# Patient Record
Sex: FEMALE | Race: WHITE | NOT HISPANIC OR LATINO | Employment: OTHER | URBAN - NONMETROPOLITAN AREA
[De-identification: names, ages, dates, MRNs, and addresses within clinical notes are randomized per-mention and may not be internally consistent; named-entity substitution may affect disease eponyms.]

---

## 2017-01-03 ENCOUNTER — OFFICE VISIT (OUTPATIENT)
Dept: SURGERY | Facility: CLINIC | Age: 62
End: 2017-01-03

## 2017-01-03 VITALS
SYSTOLIC BLOOD PRESSURE: 152 MMHG | DIASTOLIC BLOOD PRESSURE: 79 MMHG | BODY MASS INDEX: 30.08 KG/M2 | HEART RATE: 88 BPM | WEIGHT: 169.8 LBS

## 2017-01-03 DIAGNOSIS — R10.84 GENERALIZED ABDOMINAL PAIN: ICD-10-CM

## 2017-01-03 DIAGNOSIS — K59.09 OTHER CONSTIPATION: Primary | ICD-10-CM

## 2017-01-03 PROCEDURE — 99212 OFFICE O/P EST SF 10 MIN: CPT | Performed by: SURGERY

## 2017-01-03 NOTE — LETTER
January 3, 2017     Arsenio Harris MD  [address]  [facility] [address]    Patient: Elsa Porras   YOB: 1955   Date of Visit: 1/3/2017       Dear Dr. Steven MD:    Thank you for referring Elsa Porras to me for evaluation. Below are the relevant portions of my assessment and plan of care.       HPI  Patient is a 61-year-old white female here for follow-up on a colonoscopy.  He also reports she is having some discomfort in her left lower quadrant where she is had 8 hernia repairs.  Colonoscopy was normal.                ASSESSMENT  Normal colonoscopy.  With left inguinal tenderness        PLAN    Return when necessary        If you have questions, please do not hesitate to call me. I look forward to following Elsa along with you.         Sincerely,        Adrian Cruz MD        CC: No Recipients

## 2017-01-03 NOTE — PROGRESS NOTES
1/3/2017    Patient Information  Elsa Porras  [address] [place] [address]  1955  [phone] (home)     Chief Complaint   Patient presents with   • Post-op     colonoscopy to cecum         HPI  Patient is a 61-year-old white female here for follow-up on a colonoscopy.  He also reports she is having some discomfort in her left lower quadrant where she is had 8 hernia repairs.  Colonoscopy was normal.        Review of Systems:    General: weight gain 10lbs  Integumentary: negative  Eyes: negative  ENT: earache  Respiratory: shortness of breath  Gastrointestinal: nausea/vomiting, constipation, change in stool and abdominal pain  Cardiovascular: chest pain  Neurological: headaches and dizziness  Psychiatric: anxiety  Hematologic/Lymphatic: negative  Genitourinary: negative  Musculoskeletal: painful joints and back pain  Endocrine: overactive thyroid  Breasts: negative      Patient Active Problem List   Diagnosis   • Chest pain   • Mitral valve disease   • RBBB (right bundle branch block)   • GERD (gastroesophageal reflux disease)   • Hypertension   • Hyperlipidemia   • Obesity   • Hypothyroidism   • Fibromyalgia   • Anxiety and depression   • Panic disorder   • Reactive airway disease         Past Medical History   Diagnosis Date   • Anxiety and depression 2016   • Arthritis    • Asthma    • Bell's palsy    • Cancer      skin   • Chest pain    • Esophageal stricture    • Fibromyalgia 2016   • GERD (gastroesophageal reflux disease) 2016   • Hiatal hernia    • Hyperlipidemia 2016   • Hypertension 2016   • Hypothyroidism 2016   • Inguinal hernia      left   • Migraines    • Mitral valve disease 2016   • Obesity 2016   • Panic disorder 2016   • PONV (postoperative nausea and vomiting)    • Reactive airway disease 2016         Past Surgical History   Procedure Laterality Date   • Abdominal surgery     •  section     • Hysterectomy     • Cholecystectomy      • Hernia repair       multiple   • Breast surgery       Right breast lumpectomy.    • Skin cancer excision     • Colonoscopy     • Cardiac catheterization     • Colonoscopy N/A 12/21/2016     Procedure: COLONOSCOPY;  Surgeon: Adrian Cruz MD;  Location: [facility];  Service:          Family History   Problem Relation Age of Onset   • Hypertension Mother    • Cancer Father    • Hypertension Maternal Grandmother    • Heart disease Maternal Grandmother    • Hypertension Maternal Grandfather    • Heart disease Maternal Grandfather          Social History   Substance Use Topics   • Smoking status: Never Smoker   • Smokeless tobacco: Never Used   • Alcohol use No       Current Outpatient Prescriptions   Medication Sig Dispense Refill   • ALPRAZolam XR (XANAX XR) 0.5 MG 24 hr tablet Take one AM and two HS 90 tablet 4   • bumetanide (BUMEX) 1 MG tablet Take 1 mg by mouth Daily.     • cetirizine (zyrTEC) 10 MG tablet Take 10 mg by mouth Daily.     • Cholecalciferol (VITAMIN D PO) Take  by mouth.     • citalopram (CELEXA) 20 MG tablet Take 1 tablet by mouth Every Night. 30 tablet 4   • levothyroxine (SYNTHROID, LEVOTHROID) 50 MCG tablet Take 50 mcg by mouth Daily.     • omeprazole (priLOSEC) 20 MG capsule Take 20 mg by mouth Daily.     • polyethylene glycol (MIRALAX) powder Take 17 g by mouth Daily for 30 days. 1 each 1   • potassium citrate (UROCIT-K) 5 MEQ (540 MG) CR tablet Take  by mouth 3 (Three) Times a Day With Meals.     • QUEtiapine (SEROquel) 50 MG tablet Take 1 tablet by mouth Every Night. 30 tablet 4   • spironolactone (ALDACTONE) 25 MG tablet Take 25 mg by mouth Daily.       No current facility-administered medications for this visit.          Allergies  Codeine; Isosorbide; Lorcet [hydrocodone-acetaminophen]; Other; Percocet [oxycodone-acetaminophen]; and Ultram [tramadol]      Physical Exam  Surgical scar left inguinal area.  Mild tenderness    Visit Vitals   • /79   • Pulse 88   • Wt 169 lb 12.8 oz  (77 kg)   • BMI 30.08 kg/m2         ASSESSMENT  Normal colonoscopy.  With left inguinal tenderness        PLAN    Return when necessary        Adrian Cruz MD

## 2017-01-03 NOTE — MR AVS SNAPSHOT
Elsa GAITAN Porras   1/3/2017 9:45 AM   Office Visit    Dept Phone:  [phone]   Encounter #:  [id number]    Provider:  Adrian Cruz MD   Department:  [facility] GENERAL SURGERY                Your Full Care Plan              Your Updated Medication List          This list is accurate as of: 1/3/17 11:13 AM.  Always use your most recent med list.                ALDACTONE 25 MG tablet   Generic drug:  spironolactone       ALPRAZolam XR 0.5 MG 24 hr tablet   Commonly known as:  XANAX XR   Take one AM and two HS       bumetanide 1 MG tablet   Commonly known as:  BUMEX       cetirizine 10 MG tablet   Commonly known as:  zyrTEC       citalopram 20 MG tablet   Commonly known as:  CELEXA   Take 1 tablet by mouth Every Night.       levothyroxine 50 MCG tablet   Commonly known as:  SYNTHROID, LEVOTHROID       omeprazole 20 MG capsule   Commonly known as:  priLOSEC       polyethylene glycol powder   Commonly known as:  MIRALAX   Take 17 g by mouth Daily for 30 days.       potassium citrate 5 MEQ (540 MG) CR tablet   Commonly known as:  UROCIT-K       QUEtiapine 50 MG tablet   Commonly known as:  SEROquel   Take 1 tablet by mouth Every Night.       VITAMIN D PO               Instructions     None    Patient Instructions History      Upcoming Appointments     Visit Type Date Time Department    POST-OP 1/3/2017  9:45 AM [facility] SURG [facility]    MEDICINE CHECK 4/11/2017  1:30 PM DELIA Biswas Signup     Our records [facility] that [facility]. If you would like to sign up for [employer], please email [facility], [place], [facility] by [facility] [email or web address] or call [phone] to obtain an activation code.             Other Info from Your Visit           Your Appointments     Apr 11, 2017  1:30 PM EDT   Medicine Check with Braydon Ibarra MD   [facility] BEHAVIORAL HE[facility] (--)    [address] [place] [id number]   [phone]                 Allergies     Codeine      gastrointestinal upset.     Isosorbide      MONONITRATE, migraines.     Lorcet [Hydrocodone-acetaminophen]       rash, nausea, vomiting.     Other      DARVOCET, rash, nausea, vomiting,     Percocet [Oxycodone-acetaminophen]      rash, nausea and vomiting.    Ultram [Tramadol]       rash, nausea, vomiting,       Reason for Visit     Post-op colonoscopy to cecum      Vital Signs     Blood Pressure Pulse Weight Body Mass Index Smoking Status       152/79 88 169 lb 12.8 oz (77 kg) 30.08 kg/m2 Never Smoker

## 2017-03-27 RX ORDER — SPIRONOLACTONE 25 MG/1
TABLET ORAL
Qty: 30 TABLET | Refills: 5 | Status: SHIPPED | OUTPATIENT
Start: 2017-03-27 | End: 2017-11-13 | Stop reason: SDUPTHER

## 2017-04-12 ENCOUNTER — LAB (OUTPATIENT)
Dept: LAB | Facility: HOSPITAL | Age: 62
End: 2017-04-12

## 2017-04-12 ENCOUNTER — OFFICE VISIT (OUTPATIENT)
Dept: CARDIOLOGY | Facility: CLINIC | Age: 62
End: 2017-04-12

## 2017-04-12 VITALS
DIASTOLIC BLOOD PRESSURE: 72 MMHG | WEIGHT: 166 LBS | SYSTOLIC BLOOD PRESSURE: 124 MMHG | BODY MASS INDEX: 29.41 KG/M2 | HEART RATE: 72 BPM | HEIGHT: 63 IN

## 2017-04-12 DIAGNOSIS — I05.9 MITRAL VALVE DISEASE: Primary | ICD-10-CM

## 2017-04-12 DIAGNOSIS — E78.5 HYPERLIPIDEMIA LDL GOAL <100: ICD-10-CM

## 2017-04-12 DIAGNOSIS — R00.2 PALPITATIONS: ICD-10-CM

## 2017-04-12 DIAGNOSIS — R07.9 CHEST PAIN SYNDROME: ICD-10-CM

## 2017-04-12 DIAGNOSIS — R55 PRE-SYNCOPE: ICD-10-CM

## 2017-04-12 DIAGNOSIS — R06.09 DYSPNEA ON EXERTION: ICD-10-CM

## 2017-04-12 DIAGNOSIS — M79.601 RIGHT ARM PAIN: ICD-10-CM

## 2017-04-12 DIAGNOSIS — I10 ESSENTIAL HYPERTENSION: ICD-10-CM

## 2017-04-12 DIAGNOSIS — I45.10 RBBB (RIGHT BUNDLE BRANCH BLOCK): ICD-10-CM

## 2017-04-12 PROBLEM — R06.02 SHORTNESS OF BREATH: Status: ACTIVE | Noted: 2017-04-12

## 2017-04-12 LAB
ALBUMIN SERPL-MCNC: 4.4 G/DL (ref 3.2–4.8)
ALBUMIN/GLOB SERPL: 1.7 G/DL (ref 1.5–2.5)
ALP SERPL-CCNC: 77 U/L (ref 25–100)
ALT SERPL W P-5'-P-CCNC: 38 U/L (ref 7–40)
ANION GAP SERPL CALCULATED.3IONS-SCNC: 9 MMOL/L (ref 3–11)
AST SERPL-CCNC: 41 U/L (ref 0–33)
BILIRUB SERPL-MCNC: 0.5 MG/DL (ref 0.3–1.2)
BUN BLD-MCNC: 11 MG/DL (ref 9–23)
BUN/CREAT SERPL: 15.7 (ref 7–25)
CALCIUM SPEC-SCNC: 9.5 MG/DL (ref 8.7–10.4)
CHLORIDE SERPL-SCNC: 103 MMOL/L (ref 99–109)
CO2 SERPL-SCNC: 28 MMOL/L (ref 20–31)
CREAT BLD-MCNC: 0.7 MG/DL (ref 0.6–1.3)
DEPRECATED FTI SERPL-MCNC: 3 TBI
GFR SERPL CREATININE-BSD FRML MDRD: 85 ML/MIN/1.73
GLOBULIN UR ELPH-MCNC: 2.6 GM/DL
GLUCOSE BLD-MCNC: 144 MG/DL (ref 70–100)
HBA1C MFR BLD: 5.4 % (ref 4.8–5.6)
POTASSIUM BLD-SCNC: 3.9 MMOL/L (ref 3.5–5.5)
PROT SERPL-MCNC: 7 G/DL (ref 5.7–8.2)
SODIUM BLD-SCNC: 140 MMOL/L (ref 132–146)
T3RU NFR SERPL: 33.2 % (ref 23–37)
T4 SERPL-MCNC: 9 MCG/DL (ref 4.7–11.4)
TSH SERPL DL<=0.05 MIU/L-ACNC: 2.27 MIU/ML (ref 0.35–5.35)

## 2017-04-12 PROCEDURE — 36415 COLL VENOUS BLD VENIPUNCTURE: CPT

## 2017-04-12 PROCEDURE — 84436 ASSAY OF TOTAL THYROXINE: CPT

## 2017-04-12 PROCEDURE — 99214 OFFICE O/P EST MOD 30 MIN: CPT | Performed by: NURSE PRACTITIONER

## 2017-04-12 PROCEDURE — 84443 ASSAY THYROID STIM HORMONE: CPT

## 2017-04-12 PROCEDURE — 83036 HEMOGLOBIN GLYCOSYLATED A1C: CPT

## 2017-04-12 PROCEDURE — 80053 COMPREHEN METABOLIC PANEL: CPT

## 2017-04-12 PROCEDURE — 84479 ASSAY OF THYROID (T3 OR T4): CPT

## 2017-04-12 RX ORDER — ALLOPURINOL 300 MG/1
300 TABLET ORAL DAILY
Refills: 2 | COMMUNITY
Start: 2017-04-04 | End: 2017-05-26

## 2017-04-12 RX ORDER — ASPIRIN 81 MG/1
81 TABLET ORAL DAILY
COMMUNITY
End: 2020-06-19

## 2017-04-12 NOTE — PROGRESS NOTES
"Encounter Date:04/12/2017    Patient ID: Elas Porras is a 62 y.o. female who is  and resides in [place], [place].    CC/Reason for visit:  Mitral Valve Disease (Follow up); Hypertension; and Hyperlipidemia          Elsa Porras returns today at the request of her primary care provider due to complaints of presyncopal episodes.  The patient reports that approximately 6 months ago she began having \"spells\" that primarily occur while she is taking a shower or bath.  She will feel very weak, lightheaded and feels as if she will pass out.  She has to go lie down in the bed because she fears she will pass out.  After lying down she does start to feel better but will fill drained for the rest of the day.  These episodes also occur if she strenuously exerts herself.  She has taken her blood pressure during these episodes and it is always elevated with a systolic in the 160 to 170s.  Her heart rate is usually elevated over 100 during these episodes.  She will also experience shortness of breath with strenuous exertion.  She also reports a fluttering sensation in her chest that occurs almost weekly and is not necessarily associated with her \"spells\".  She denies chest pain, orthopnea or syncope.  She does report right arm pain that feels like a band wrapped tight that is constant.  It feels better when she stretches her arm out.  She says it almost feels like a charley horse.  She initially presented to her primary care provider with these symptoms who suggested she make an appointment with her cardiologist.  She has been taking spironolactone and Bumex for the past 2-3 years that she believes was ordered to control her blood pressure and because of edema.  She reports having a history of low potassium levels.  She is taking 5 mEq potassium daily.  It feels better when she stretches her arm out.  She does not recall injuring it in any way.  She is not sure if this is related to her fibromyalgia.  She also has skin " cancer and has been seeing a local dermatologist.  She wused a topical chemotherapy cream throughout the month of January and February.       Review of Systems   Constitution: Positive for chills, weakness and malaise/fatigue.   HENT: Positive for headaches.    Eyes: Positive for blurred vision and visual halos.   Cardiovascular: Positive for chest pain, dyspnea on exertion, leg swelling and palpitations.   Respiratory: Positive for shortness of breath, snoring and wheezing.    Endocrine: Positive for heat intolerance.   Skin: Positive for dry skin, skin cancer and suspicious lesions.   Musculoskeletal: Positive for arthritis, back pain, gout, joint pain, joint swelling, muscle cramps, myalgias, neck pain and stiffness.   Gastrointestinal: Positive for bloating and flatus.   Neurological: Positive for difficulty with concentration and dizziness.   Psychiatric/Behavioral: The patient is nervous/anxious.        The patient's past medical, social, and family history reviewed in the patient's electronic medical record.    Allergies  Codeine; Isosorbide nitrate; Lorcet [hydrocodone-acetaminophen]; Other; Percocet [oxycodone-acetaminophen]; and Ultram [tramadol]    Outpatient Prescriptions Marked as Taking for the 4/12/17 encounter (Office Visit) with Binh Ga MD   Medication Sig Dispense Refill   • allopurinol (ZYLOPRIM) 300 MG tablet Take 300 mg by mouth Daily.  2   • ALPRAZolam XR (XANAX XR) 0.5 MG 24 hr tablet Take one AM and two HS 90 tablet 4   • aspirin 81 MG EC tablet Take 81 mg by mouth Daily.     • Cholecalciferol (VITAMIN D PO) Take  by mouth.     • citalopram (CELEXA) 20 MG tablet Take 1 tablet by mouth Every Night. 30 tablet 4   • levothyroxine (SYNTHROID, LEVOTHROID) 50 MCG tablet Take 50 mcg by mouth Daily.     • potassium citrate (UROCIT-K) 5 MEQ (540 MG) CR tablet Take  by mouth 3 (Three) Times a Day With Meals.     • QUEtiapine (SEROquel) 50 MG tablet Take 1 tablet by mouth Every Night.  "30 tablet 4   • spironolactone (ALDACTONE) 25 MG tablet TAKE 1 TABLET BY MOUTH ONCE A DAY IN THE MORNING AS DIRECTED BY YOUR PRESCRIBER FOR FLUID RETENTION 30 tablet 5   • [DISCONTINUED] bumetanide (BUMEX) 1 MG tablet Take 1 mg by mouth Daily.           Blood pressure 124/72, pulse 72, height 63\" (160 cm), weight 166 lb (75.3 kg).  Body mass index is 29.41 kg/(m^2).    Physical Exam   Constitutional: She is oriented to person, place, and time. She appears well-developed and well-nourished.   HENT:   Head: Normocephalic and atraumatic.   Eyes: Pupils are equal, round, and reactive to light. No scleral icterus.   Neck: No JVD present. Carotid bruit is not present. No thyromegaly present.   Cardiovascular: Normal rate, regular rhythm, S1 normal and S2 normal.  Exam reveals no gallop.    No murmur heard.  Pulmonary/Chest: Effort normal and breath sounds normal.   Abdominal: Soft. There is no tenderness.   Neurological: She is alert and oriented to person, place, and time.   Skin: Skin is warm and dry. No cyanosis. Nails show no clubbing.   Psychiatric: She has a normal mood and affect. Her behavior is normal.       Data Review:     Lab Results   Component Value Date    GLUCOSE 90 03/18/2016    BUN 8 (L) 03/18/2016    CREATININE 0.8 03/18/2016    BCR 12.3 11/15/2015    K 4.7 03/18/2016    CO2 25 03/18/2016    CALCIUM 9.7 03/18/2016    ALBUMIN 4.3 03/18/2016    LABIL2 1.5 11/15/2015    AST 31 03/18/2016    ALT 23 03/18/2016     Lab Results   Component Value Date    WBC 8.9 11/15/2015    HGB 14.5 11/15/2015    HCT 45.0 11/15/2015    MCV 92.5 11/15/2015     11/15/2015         ECG 12 Lead  Date/Time: 4/12/2017 11:08 AM  Performed by: RODGER FRIEDMAN  Authorized by: RODGER FRIEDMAN   Rhythm: sinus rhythm  BPM: 72  Conduction: right bundle branch block  Comments: NV interval 144 MS  QRS duration 1:30 MS  QT//407 MS                 Problem List Items Addressed This Visit        Cardiovascular and Mediastinum "    RBBB (right bundle branch block)    Overview     · Reportedly moderate mitral regurgitation on echocardiogram, 2009.   · Echocardiogram (02/21/2013): LVEF 55% to 60%, trace MR.   · Echocardiogram (08/13/2015):  LVEF > than 65%. No hemodynamically significant valvular disease.         Current Assessment & Plan     · Obtain echocardiogram         Essential hypertension    Current Assessment & Plan     · Continue spiral lactone 25 mg daily  · Hold Bumex  · Obtain CMP         Relevant Orders    Comprehensive Metabolic Panel    Hyperlipidemia LDL goal <100    Current Assessment & Plan     · Continue healthy heart diet  · Statin therapy not needed at this time         Palpitations    Current Assessment & Plan     · 2 week ZIO patch  · Echocardiogram at [facility]  · Obtain TSH and thyroid panel         Relevant Orders    Adult Transthoracic Echo Complete With Contrast    Cardiac Event Monitor    Thyroid Panel With TSH    Pre-syncope    Current Assessment & Plan     · 2 week ZIO patch  · Echocardiogram at [facility]  · Obtain hemoglobin A1c         Relevant Orders    Adult Transthoracic Echo Complete With Contrast    Cardiac Event Monitor    Hemoglobin A1c    RESOLVED: Mitral valve disease - Primary    Overview                   Respiratory    Dyspnea on exertion    Current Assessment & Plan     · Echocardiogram         Relevant Orders    Adult Transthoracic Echo Complete With Contrast       Nervous and Auditory    Chest pain syndrome    Overview     · Previous heart catheterization with normal coronary arteries by Dr. Theodore, data-deficit.   · MPS (2008): Normal pharmacologic study with no ischemia  · MPS (04/10/2012): Normal pharmacologic study with no ischemia  · Recurrent chest pain at [facility] ER presentation with negative enzymes and EKG, (07/29/2013)..  · MPS (03/18/2016): Normal pharmacologic study with no ischemia..         Current Assessment & Plan     · Asymptomatic at this time. Stress  test in 2016 normal         Right arm pain    Current Assessment & Plan     · Likely related to fibromyalgia or musculoskeletal issues, follow-up with primary care provider             Mrs. Porras has an array of symptoms and I'm not certain if they are cardiac in nature.  She could be experiencing vasovagal syncope, arrhythmias, electrolyte imbalances, dehydration or symptoms related to her right bundle branch block although her EKG is unchanged when compared to an EKG from 2015.  I will obtain an echocardiogram and order a 30 day event monitor.  I will obtain a TSH with thyroid panel, CMP, and a hemoglobin A1c.  I had requested she hold her Bumex and to contact us in one week to see if this improves her symptoms.  We will have her follow back up with us in 6 weeks to review her results and make further recommendations.     · Hold Bumex and contact us in one week to report symptoms  · 30 day event monitor  · TSH with thyroid panel, CMP, hemoglobin A1c  · Echocardiogram at [facility] and schedule follow up for same day in 6 weeks.    Anjali OMER evaluated treated patient independently    KAN Titus  4/12/2017

## 2017-04-12 NOTE — ASSESSMENT & PLAN NOTE
· 2 week ZIO patch  · Echocardiogram at [facility]  · Obtain hemoglobin A1c

## 2017-04-12 NOTE — ASSESSMENT & PLAN NOTE
· 2 week ZIO patch  · Echocardiogram at [facility]  · Obtain TSH and thyroid panel

## 2017-04-14 ENCOUNTER — TELEPHONE (OUTPATIENT)
Dept: CARDIOLOGY | Facility: CLINIC | Age: 62
End: 2017-04-14

## 2017-04-14 NOTE — TELEPHONE ENCOUNTER
Patient called to report that she has started taking her Bumex again, only after holding it for 2 days. She stated that she started noticing significant increase in swelling of her hands, face, and ankles. She did not think any of her symptoms were resolved with only holding the medicine 2 days. I discussed her lab results with the patient. She verbalized understanding and will call back with any further problems.

## 2017-05-11 ENCOUNTER — OFFICE VISIT (OUTPATIENT)
Dept: PSYCHIATRY | Facility: CLINIC | Age: 62
End: 2017-05-11

## 2017-05-11 VITALS
HEIGHT: 63 IN | WEIGHT: 168 LBS | HEART RATE: 76 BPM | BODY MASS INDEX: 29.77 KG/M2 | SYSTOLIC BLOOD PRESSURE: 131 MMHG | DIASTOLIC BLOOD PRESSURE: 65 MMHG

## 2017-05-11 DIAGNOSIS — F34.1 DYSTHYMIC DISORDER: Primary | ICD-10-CM

## 2017-05-11 PROCEDURE — 99213 OFFICE O/P EST LOW 20 MIN: CPT | Performed by: PSYCHIATRY & NEUROLOGY

## 2017-05-11 RX ORDER — ALPRAZOLAM 0.5 MG/1
TABLET, EXTENDED RELEASE ORAL
Qty: 90 TABLET | Refills: 4 | Status: SHIPPED | OUTPATIENT
Start: 2017-05-11 | End: 2017-05-11 | Stop reason: SDUPTHER

## 2017-05-11 RX ORDER — QUETIAPINE FUMARATE 50 MG/1
50 TABLET, FILM COATED ORAL NIGHTLY
Qty: 90 TABLET | Refills: 1 | Status: SHIPPED | OUTPATIENT
Start: 2017-05-11 | End: 2018-01-11 | Stop reason: SDUPTHER

## 2017-05-11 RX ORDER — QUETIAPINE FUMARATE 50 MG/1
50 TABLET, FILM COATED ORAL NIGHTLY
Qty: 30 TABLET | Refills: 4 | Status: SHIPPED | OUTPATIENT
Start: 2017-05-11 | End: 2017-05-11 | Stop reason: SDUPTHER

## 2017-05-11 RX ORDER — CITALOPRAM 20 MG/1
20 TABLET ORAL NIGHTLY
Qty: 90 TABLET | Refills: 1 | Status: SHIPPED | OUTPATIENT
Start: 2017-05-11 | End: 2018-01-11 | Stop reason: SDUPTHER

## 2017-05-11 RX ORDER — CITALOPRAM 20 MG/1
20 TABLET ORAL NIGHTLY
Qty: 30 TABLET | Refills: 4 | Status: SHIPPED | OUTPATIENT
Start: 2017-05-11 | End: 2017-05-11 | Stop reason: SDUPTHER

## 2017-05-11 RX ORDER — ALPRAZOLAM 0.5 MG/1
TABLET, EXTENDED RELEASE ORAL
Qty: 90 TABLET | Refills: 4 | Status: SHIPPED | OUTPATIENT
Start: 2017-05-11 | End: 2017-10-17 | Stop reason: SDUPTHER

## 2017-05-22 RX ORDER — ALPRAZOLAM 0.5 MG/1
TABLET, EXTENDED RELEASE ORAL
Qty: 90 TABLET | Refills: 4 | OUTPATIENT
Start: 2017-05-22

## 2017-05-26 ENCOUNTER — OFFICE VISIT (OUTPATIENT)
Dept: CARDIOLOGY | Facility: CLINIC | Age: 62
End: 2017-05-26

## 2017-05-26 ENCOUNTER — HOSPITAL ENCOUNTER (OUTPATIENT)
Dept: CARDIOLOGY | Facility: HOSPITAL | Age: 62
Discharge: HOME | End: 2017-05-26
Admitting: NURSE PRACTITIONER

## 2017-05-26 VITALS
SYSTOLIC BLOOD PRESSURE: 122 MMHG | BODY MASS INDEX: 29.06 KG/M2 | HEIGHT: 63 IN | DIASTOLIC BLOOD PRESSURE: 80 MMHG | WEIGHT: 164 LBS | HEART RATE: 59 BPM

## 2017-05-26 VITALS
HEIGHT: 63 IN | SYSTOLIC BLOOD PRESSURE: 132 MMHG | WEIGHT: 162 LBS | BODY MASS INDEX: 28.7 KG/M2 | DIASTOLIC BLOOD PRESSURE: 71 MMHG

## 2017-05-26 DIAGNOSIS — R00.2 PALPITATIONS: ICD-10-CM

## 2017-05-26 DIAGNOSIS — E78.5 HYPERLIPIDEMIA LDL GOAL <100: ICD-10-CM

## 2017-05-26 DIAGNOSIS — R06.09 DYSPNEA ON EXERTION: ICD-10-CM

## 2017-05-26 DIAGNOSIS — I10 ESSENTIAL HYPERTENSION: ICD-10-CM

## 2017-05-26 DIAGNOSIS — R07.9 CHEST PAIN SYNDROME: Primary | ICD-10-CM

## 2017-05-26 DIAGNOSIS — R55 PRE-SYNCOPE: ICD-10-CM

## 2017-05-26 DIAGNOSIS — I45.10 RBBB (RIGHT BUNDLE BRANCH BLOCK): ICD-10-CM

## 2017-05-26 DIAGNOSIS — I34.1 MITRAL VALVE PROLAPSE: ICD-10-CM

## 2017-05-26 PROBLEM — M79.601 RIGHT ARM PAIN: Status: RESOLVED | Noted: 2017-04-12 | Resolved: 2017-05-26

## 2017-05-26 LAB
BH CV ECHO MEAS - BSA(HAYCOCK): 1.8 M^2
BH CV ECHO MEAS - BSA: 1.8 M^2
BH CV ECHO MEAS - BZI_BMI: 28.7 KILOGRAMS/M^2
BH CV ECHO MEAS - BZI_METRIC_HEIGHT: 160 CM
BH CV ECHO MEAS - BZI_METRIC_WEIGHT: 73.5 KG
BH CV ECHO MEAS - CONTRAST EF (2CH): 64 ML/M^2
BH CV ECHO MEAS - CONTRAST EF 4CH: 77.4 ML/M^2
BH CV ECHO MEAS - EDV(CUBED): 55.7 ML
BH CV ECHO MEAS - EDV(MOD-SP2): 75 ML
BH CV ECHO MEAS - EDV(MOD-SP4): 84 ML
BH CV ECHO MEAS - EDV(TEICH): 62.7 ML
BH CV ECHO MEAS - EF(CUBED): 71.6 %
BH CV ECHO MEAS - EF(MOD-SP2): 64 %
BH CV ECHO MEAS - EF(MOD-SP4): 77.4 %
BH CV ECHO MEAS - EF(TEICH): 64.1 %
BH CV ECHO MEAS - ESV(CUBED): 15.8 ML
BH CV ECHO MEAS - ESV(MOD-SP2): 27 ML
BH CV ECHO MEAS - ESV(MOD-SP4): 19 ML
BH CV ECHO MEAS - ESV(TEICH): 22.5 ML
BH CV ECHO MEAS - FS: 34.3 %
BH CV ECHO MEAS - IVS/LVPW: 1.2
BH CV ECHO MEAS - IVSD: 0.79 CM
BH CV ECHO MEAS - LA DIMENSION: 3.6 CM
BH CV ECHO MEAS - LV DIASTOLIC VOL/BSA (35-75): 47.5 ML/M^2
BH CV ECHO MEAS - LV MASS(C)D: 76.5 GRAMS
BH CV ECHO MEAS - LV MASS(C)DI: 43.3 GRAMS/M^2
BH CV ECHO MEAS - LV MAX PG: 3.6 MMHG
BH CV ECHO MEAS - LV MEAN PG: 2 MMHG
BH CV ECHO MEAS - LV SYSTOLIC VOL/BSA (12-30): 10.7 ML/M^2
BH CV ECHO MEAS - LV V1 MAX: 95.5 CM/SEC
BH CV ECHO MEAS - LV V1 MEAN: 67.8 CM/SEC
BH CV ECHO MEAS - LV V1 VTI: 26.7 CM
BH CV ECHO MEAS - LVIDD: 3.8 CM
BH CV ECHO MEAS - LVIDS: 2.5 CM
BH CV ECHO MEAS - LVLD AP2: 6.8 CM
BH CV ECHO MEAS - LVLD AP4: 7.4 CM
BH CV ECHO MEAS - LVLS AP2: 5.2 CM
BH CV ECHO MEAS - LVLS AP4: 5.1 CM
BH CV ECHO MEAS - LVOT AREA (M): 2.5 CM^2
BH CV ECHO MEAS - LVOT AREA: 2.5 CM^2
BH CV ECHO MEAS - LVOT DIAM: 1.8 CM
BH CV ECHO MEAS - LVPWD: 0.67 CM
BH CV ECHO MEAS - PA ACC SLOPE: 444 CM/SEC^2
BH CV ECHO MEAS - PA ACC TIME: 0.18 SEC
BH CV ECHO MEAS - PA PR(ACCEL): -2.9 MMHG
BH CV ECHO MEAS - RAP SYSTOLE: 8 MMHG
BH CV ECHO MEAS - RVDD: 3.1 CM
BH CV ECHO MEAS - RVSP: 23.8 MMHG
BH CV ECHO MEAS - SI(CUBED): 22.6 ML/M^2
BH CV ECHO MEAS - SI(LVOT): 38.4 ML/M^2
BH CV ECHO MEAS - SI(MOD-SP2): 27.1 ML/M^2
BH CV ECHO MEAS - SI(MOD-SP4): 36.8 ML/M^2
BH CV ECHO MEAS - SI(TEICH): 22.7 ML/M^2
BH CV ECHO MEAS - SV(CUBED): 39.9 ML
BH CV ECHO MEAS - SV(LVOT): 67.9 ML
BH CV ECHO MEAS - SV(MOD-SP2): 48 ML
BH CV ECHO MEAS - SV(MOD-SP4): 65 ML
BH CV ECHO MEAS - SV(TEICH): 40.2 ML
BH CV ECHO MEAS - TAPSE (>1.6): 2.3 CM2
BH CV ECHO MEAS - TR MAX VEL: 199 CM/SEC
BH CV XLRA - RV BASE: 3 CM
BH CV XLRA - RV LENGTH: 5 CM
BH CV XLRA - RV MID: 2.9 CM
LEFT ATRIUM VOLUME INDEX: 23 ML/M2
LV EF 2D ECHO EST: 65 %

## 2017-05-26 PROCEDURE — 93306 TTE W/DOPPLER COMPLETE: CPT | Performed by: INTERNAL MEDICINE

## 2017-05-26 PROCEDURE — 99214 OFFICE O/P EST MOD 30 MIN: CPT | Performed by: INTERNAL MEDICINE

## 2017-05-26 PROCEDURE — 25010000002 SULFUR HEXAFLUORIDE MICROSPH 60.7-25 MG RECONSTITUTED SUSPENSION: Performed by: INTERNAL MEDICINE

## 2017-05-26 PROCEDURE — C8929 TTE W OR WO FOL WCON,DOPPLER: HCPCS

## 2017-05-26 RX ORDER — BUMETANIDE 1 MG/1
1 TABLET ORAL DAILY
COMMUNITY
End: 2017-08-17 | Stop reason: SDUPTHER

## 2017-05-26 RX ORDER — POLYETHYLENE GLYCOL 3350 17 G/17G
POWDER, FOR SOLUTION ORAL AS NEEDED
Refills: 0 | COMMUNITY
Start: 2017-05-22 | End: 2019-06-19

## 2017-05-26 RX ADMIN — SULFUR HEXAFLUORIDE 3 ML: KIT at 10:43

## 2017-06-13 ENCOUNTER — DOCUMENTATION (OUTPATIENT)
Dept: CARDIOLOGY | Facility: CLINIC | Age: 62
End: 2017-06-13

## 2017-07-10 ENCOUNTER — OFFICE VISIT (OUTPATIENT)
Dept: CARDIOLOGY | Facility: CLINIC | Age: 62
End: 2017-07-10

## 2017-07-10 DIAGNOSIS — R00.2 PALPITATIONS: ICD-10-CM

## 2017-07-11 PROCEDURE — 93272 ECG/REVIEW INTERPRET ONLY: CPT | Performed by: INTERNAL MEDICINE

## 2017-07-12 ENCOUNTER — TRANSCRIBE ORDERS (OUTPATIENT)
Dept: ADMINISTRATIVE | Facility: HOSPITAL | Age: 62
End: 2017-07-12

## 2017-07-12 DIAGNOSIS — Z12.31 VISIT FOR SCREENING MAMMOGRAM: Primary | ICD-10-CM

## 2017-07-14 ENCOUNTER — HOSPITAL ENCOUNTER (OUTPATIENT)
Dept: MAMMOGRAPHY | Facility: HOSPITAL | Age: 62
Discharge: HOME | End: 2017-07-14
Admitting: FAMILY MEDICINE

## 2017-07-14 DIAGNOSIS — Z12.31 VISIT FOR SCREENING MAMMOGRAM: ICD-10-CM

## 2017-07-14 PROCEDURE — G0202 SCR MAMMO BI INCL CAD: HCPCS

## 2017-07-14 PROCEDURE — 77063 BREAST TOMOSYNTHESIS BI: CPT

## 2017-07-14 PROCEDURE — 77063 BREAST TOMOSYNTHESIS BI: CPT | Performed by: RADIOLOGY

## 2017-07-14 PROCEDURE — 77067 SCR MAMMO BI INCL CAD: CPT | Performed by: RADIOLOGY

## 2017-08-17 RX ORDER — BUMETANIDE 1 MG/1
TABLET ORAL
Qty: 30 TABLET | Refills: 5 | Status: SHIPPED | OUTPATIENT
Start: 2017-08-17 | End: 2018-02-09 | Stop reason: SDUPTHER

## 2017-10-18 ENCOUNTER — TELEPHONE (OUTPATIENT)
Dept: PSYCHIATRY | Facility: CLINIC | Age: 62
End: 2017-10-18

## 2017-10-18 DIAGNOSIS — K59.09 OTHER CONSTIPATION: ICD-10-CM

## 2017-10-18 RX ORDER — ALPRAZOLAM 0.5 MG/1
TABLET, EXTENDED RELEASE ORAL
Qty: 90 TABLET | Refills: 0 | Status: SHIPPED | OUTPATIENT
Start: 2017-10-18 | End: 2017-12-06 | Stop reason: SDUPTHER

## 2017-10-19 RX ORDER — POLYETHYLENE GLYCOL 3350 17 G/17G
POWDER, FOR SOLUTION ORAL
Qty: 527 G | Refills: 1 | Status: SHIPPED | OUTPATIENT
Start: 2017-10-19 | End: 2018-01-11 | Stop reason: SDUPTHER

## 2017-11-09 ENCOUNTER — TELEPHONE (OUTPATIENT)
Dept: PSYCHIATRY | Facility: CLINIC | Age: 62
End: 2017-11-09

## 2017-11-09 NOTE — TELEPHONE ENCOUNTER
Okay to call in the patient's Seroquel at 50 mg #30 to take 1 at bedtime no refill and Celexa 20 to take 1 daily #30 with no refill

## 2017-11-09 NOTE — TELEPHONE ENCOUNTER
Please call in a prescription for 90 0.5 milligram alprazolam extended release with no refill to the patient's pharmacy.  Prescription should not be filled prior to November 17.    Also advised patient that we'll need to see her prior to any additional prescriptions.

## 2017-11-14 RX ORDER — SPIRONOLACTONE 25 MG/1
TABLET ORAL
Qty: 30 TABLET | Refills: 6 | Status: SHIPPED | OUTPATIENT
Start: 2017-11-14 | End: 2018-05-24 | Stop reason: SDUPTHER

## 2017-12-06 ENCOUNTER — TELEPHONE (OUTPATIENT)
Dept: PSYCHIATRY | Facility: CLINIC | Age: 62
End: 2017-12-06

## 2017-12-06 RX ORDER — ALPRAZOLAM 0.5 MG/1
TABLET, EXTENDED RELEASE ORAL
Qty: 90 TABLET | Refills: 0 | Status: SHIPPED | OUTPATIENT
Start: 2017-12-06 | End: 2018-01-11 | Stop reason: SDUPTHER

## 2018-01-11 ENCOUNTER — OFFICE VISIT (OUTPATIENT)
Dept: PSYCHIATRY | Facility: CLINIC | Age: 63
End: 2018-01-11

## 2018-01-11 VITALS
WEIGHT: 164 LBS | HEART RATE: 77 BPM | DIASTOLIC BLOOD PRESSURE: 67 MMHG | HEIGHT: 63 IN | BODY MASS INDEX: 29.06 KG/M2 | SYSTOLIC BLOOD PRESSURE: 132 MMHG

## 2018-01-11 DIAGNOSIS — F34.1 DYSTHYMIC DISORDER: Primary | ICD-10-CM

## 2018-01-11 PROCEDURE — 99213 OFFICE O/P EST LOW 20 MIN: CPT | Performed by: PSYCHIATRY & NEUROLOGY

## 2018-01-11 RX ORDER — SIMVASTATIN 40 MG
TABLET ORAL NIGHTLY
Refills: 2 | COMMUNITY
Start: 2018-01-02 | End: 2021-03-12 | Stop reason: SDUPTHER

## 2018-01-11 RX ORDER — QUETIAPINE FUMARATE 50 MG/1
TABLET, FILM COATED ORAL
Qty: 180 TABLET | Refills: 1 | Status: SHIPPED | OUTPATIENT
Start: 2018-01-11 | End: 2018-07-10 | Stop reason: SDUPTHER

## 2018-01-11 RX ORDER — CITALOPRAM 20 MG/1
20 TABLET ORAL NIGHTLY
Qty: 90 TABLET | Refills: 1 | Status: SHIPPED | OUTPATIENT
Start: 2018-01-11 | End: 2018-07-10 | Stop reason: SDUPTHER

## 2018-01-11 RX ORDER — ALLOPURINOL 300 MG/1
300 TABLET ORAL DAILY
Refills: 2 | COMMUNITY
Start: 2018-01-02 | End: 2021-12-03

## 2018-01-11 RX ORDER — ALPRAZOLAM 0.5 MG/1
TABLET, EXTENDED RELEASE ORAL
Qty: 90 TABLET | Refills: 5 | Status: SHIPPED | OUTPATIENT
Start: 2018-01-11 | End: 2018-06-29 | Stop reason: SDUPTHER

## 2018-01-11 NOTE — PROGRESS NOTES
"Patient ID: Elsa Porras is a 62 y.o. female    SERVICE TYPE: EVALUATION AND MANAGEMENT (greater than 50% of the time spent for supportive psychotherapy).      /67  Pulse 77  Ht 160 cm (62.99\")  Wt 74.4 kg (164 lb)  BMI 29.06 kg/m2    ALLERGIES:  Codeine; Isosorbide nitrate; Lorcet [hydrocodone-acetaminophen]; Other; Percocet [oxycodone-acetaminophen]; and Ultram [tramadol]    CC: \"I'm a whole lot better, just have some trouble sleeping\"     FOLLOWED FOR: Depression/ Anxiety.     HPI: Menopausal symptoms effecting sleep, not been significantly depressed, worries about her 's health and a friend who is grieving the death of her .  Interest activity and social interactions after norms.     PFSH: Finds rewards in keeping her grandchildren and great-grandchildren on occasion.    Review of Systems   Respiratory: Negative.    Cardiovascular: Negative.    Gastrointestinal: Negative.    Genitourinary: Negative.    Musculoskeletal: Negative.        SUPPORTIVE PSYCHOTHERAPY: continuing efforts to promote the therapeutic alliance, address the patient’s issues, and strengthen self awareness, insights, and coping skills.       Mental Status Exam  Appearance:  clean and casually dressed, appropriate  Attitude toward clinician:  cooperative and agreeable   Speech:    Rate:  regular rate and rhythm   Volume: normal  Motor:  no abnormal movements present  Mood:  Good  Affect:  euthymic  Thought Processes:  linear, logical, and goal directed  Thought Content:  Normal   Feeling Hopeless: absent  Suicidal Thoughts:  absent  Homicidal Thoughts:  absent  Perceptual Disturbance: no perceptual disturbance  Attention and Concentration:  good  Insight and Judgement:  good  Memory:  memory appears to be intact    LABS: No results found for this or any previous visit (from the past 168 hour(s)).    MEDICATION ISSUES: Have discussed with the patient the medications Risks, Benefits, and Side effects including " potential falls, possible impaired driving and  metabolic adversities among others. No medication side effects or related complaints today.     TREATMENT PLAN/GOALS: Continue supportive psychotherapy efforts and medications as indicated.     Current Outpatient Prescriptions   Medication Sig Dispense Refill   • allopurinol (ZYLOPRIM) 300 MG tablet   2   • ALPRAZolam XR (XANAX XR) 0.5 MG 24 hr tablet Take one AM and two HS 90 tablet 5   • aspirin 81 MG EC tablet Take 81 mg by mouth Daily.     • bumetanide (BUMEX) 1 MG tablet TAKE 1 TABLET BY MOUTH ONCE A DAY IN THE MORNING AS DIRECTED BY YOUR PRESCRIBER FOR FLUID RETENTION 30 tablet 5   • Cholecalciferol (VITAMIN D PO) Take 50,000 Units by mouth Every 30 (Thirty) Days.     • citalopram (CELEXA) 20 MG tablet Take 1 tablet by mouth Every Night. 90 tablet 1   • levothyroxine (SYNTHROID, LEVOTHROID) 50 MCG tablet Take 50 mcg by mouth Daily.     • omeprazole (priLOSEC) 20 MG capsule Take 20 mg by mouth Daily As Needed.     • polyethylene glycol (MIRALAX) powder As Needed.  0   • potassium citrate (UROCIT-K) 5 MEQ (540 MG) CR tablet Take  by mouth 3 (Three) Times a Day With Meals.     • QUEtiapine (SEROquel) 50 MG tablet One or two at hs. 180 tablet 1   • simvastatin (ZOCOR) 40 MG tablet   2   • spironolactone (ALDACTONE) 25 MG tablet TAKE 1 TABLET EVERY MORNING AS DIRECTED BY YOUR PRESCRIBER FOR FLUID AND BLOOD PRESSURE 30 tablet 6     No current facility-administered medications for this visit.        COLLATERAL PSYCHOTHERAPEUTIC INTERVENTION: patient not interested in additional psychotherapy.     Encounter Diagnosis   Name Primary?   • Dysthymic disorder Yes       Return in about 6 months (around 7/11/2018).  Patient knows to call if symptoms worsen or fail to improve between appointments.

## 2018-02-09 RX ORDER — BUMETANIDE 1 MG/1
TABLET ORAL
Qty: 30 TABLET | Refills: 5 | Status: SHIPPED | OUTPATIENT
Start: 2018-02-09 | End: 2018-07-17 | Stop reason: SDUPTHER

## 2018-05-24 RX ORDER — SPIRONOLACTONE 25 MG/1
TABLET ORAL
Qty: 30 TABLET | Refills: 1 | Status: SHIPPED | OUTPATIENT
Start: 2018-05-24 | End: 2018-07-17 | Stop reason: SDUPTHER

## 2018-06-28 RX ORDER — ALPRAZOLAM 0.5 MG/1
TABLET, EXTENDED RELEASE ORAL
Qty: 90 TABLET | Refills: 5 | Status: CANCELLED | OUTPATIENT
Start: 2018-06-28

## 2018-06-28 RX ORDER — ALPRAZOLAM 0.5 MG/1
TABLET, EXTENDED RELEASE ORAL
Qty: 90 TABLET | Refills: 5 | OUTPATIENT
Start: 2018-06-28

## 2018-06-29 ENCOUNTER — TELEPHONE (OUTPATIENT)
Dept: PSYCHIATRY | Facility: CLINIC | Age: 63
End: 2018-06-29

## 2018-06-29 RX ORDER — ALPRAZOLAM 0.5 MG/1
TABLET, EXTENDED RELEASE ORAL
Qty: 20 TABLET | Refills: 0 | Status: SHIPPED | OUTPATIENT
Start: 2018-06-29 | End: 2018-07-10 | Stop reason: SDUPTHER

## 2018-07-02 NOTE — TELEPHONE ENCOUNTER
Try again to get the message across that we will be making up the difference with early refills in the future

## 2018-07-10 ENCOUNTER — OFFICE VISIT (OUTPATIENT)
Dept: PSYCHIATRY | Facility: CLINIC | Age: 63
End: 2018-07-10

## 2018-07-10 VITALS
HEART RATE: 98 BPM | DIASTOLIC BLOOD PRESSURE: 65 MMHG | SYSTOLIC BLOOD PRESSURE: 117 MMHG | HEIGHT: 63 IN | BODY MASS INDEX: 29.06 KG/M2 | WEIGHT: 164 LBS

## 2018-07-10 DIAGNOSIS — F34.1 DYSTHYMIC DISORDER: ICD-10-CM

## 2018-07-10 DIAGNOSIS — Z79.899 MEDICATION MANAGEMENT: Primary | ICD-10-CM

## 2018-07-10 LAB
AMPHETAMINE CUT-OFF: 1000
BENZODIAZIPINE CUT-OFF: 300
BUPRENORPHINE CUT-OFF: 10
COCAINE CUT-OFF: 300
EXTERNAL AMPHETAMINE SCREEN URINE: NEGATIVE
EXTERNAL BENZODIAZEPINE SCREEN URINE: POSITIVE
EXTERNAL BUPRENORPHINE SCREEN URINE: NEGATIVE
EXTERNAL COCAINE SCREEN URINE: NEGATIVE
EXTERNAL MDMA: NEGATIVE
EXTERNAL METHADONE SCREEN URINE: NEGATIVE
EXTERNAL METHAMPHETAMINE SCREEN URINE: NEGATIVE
EXTERNAL OPIATES SCREEN URINE: NEGATIVE
EXTERNAL OXYCODONE SCREEN URINE: NEGATIVE
EXTERNAL THC SCREEN URINE: NEGATIVE
MDMA CUT-OFF: 500
METHADONE CUT-OFF: 300
METHAMPHETAMINE CUT-OFF: 1000
OPIATES CUT-OFF: 300
OXYCODONE CUT-OFF: 100
THC CUT-OFF: 50

## 2018-07-10 PROCEDURE — 99213 OFFICE O/P EST LOW 20 MIN: CPT | Performed by: PSYCHIATRY & NEUROLOGY

## 2018-07-10 RX ORDER — QUETIAPINE FUMARATE 50 MG/1
TABLET, FILM COATED ORAL
Qty: 180 TABLET | Refills: 1 | Status: SHIPPED | OUTPATIENT
Start: 2018-07-10 | End: 2019-02-01 | Stop reason: SDUPTHER

## 2018-07-10 RX ORDER — ALPRAZOLAM 0.5 MG/1
TABLET, EXTENDED RELEASE ORAL
Qty: 90 TABLET | Refills: 5 | Status: SHIPPED | OUTPATIENT
Start: 2018-07-10 | End: 2018-12-31 | Stop reason: SDUPTHER

## 2018-07-10 RX ORDER — CITALOPRAM 20 MG/1
20 TABLET ORAL NIGHTLY
Qty: 90 TABLET | Refills: 1 | Status: SHIPPED | OUTPATIENT
Start: 2018-07-10 | End: 2019-02-01 | Stop reason: SDUPTHER

## 2018-07-10 NOTE — PROGRESS NOTES
"Patient ID: Elsa Proras is a 63 y.o. female    SERVICE TYPE: EVALUATION AND MANAGEMENT (greater than 50% of the time spent for supportive psychotherapy).      /65   Pulse 98   Ht 160 cm (62.99\")   Wt 74.4 kg (164 lb)   BMI 29.06 kg/m²     ALLERGIES:  Codeine; Isosorbide nitrate; Lorcet [hydrocodone-acetaminophen]; Other; Percocet [oxycodone-acetaminophen]; and Ultram [tramadol]    CC: \"Pretty good\"     FOLLOWED FOR: Depression/ Anxiety.     HPI: No recurrent episode of major depression. Activities and interest normal. Sleeping 7-8 hours/night with current medication. No avoidance preferring to be at home with her  and grand children.     Lengthy discussion about her coming up 10 days short with the ER Xanax Rx. No apparent explanation.  Believe the patient that she took the meds as Rx'ed, have known her for years to be compliant. She does understand that future variants won't be excused. Will check her AJ. UDS today OK.       PFSH:attending [employer], home life stable.     Review of Systems   Respiratory:        Recent Bronchitis.    Cardiovascular: Negative.    Genitourinary:        Pelvic prolapse.    Musculoskeletal:        Knee DJD.        SUPPORTIVE PSYCHOTHERAPY: continuing efforts to promote the therapeutic alliance, address the patient’s issues, and strengthen self awareness, insights, and coping skills.       Mental Status Exam  Appearance:  clean and casually dressed, appropriate  Attitude toward clinician:  cooperative and agreeable   Speech:    Rate:  regular rate and rhythm   Volume: normal  Motor:  no abnormal movements present  Mood:  Good  Affect:  euthymic  Thought Processes:  linear, logical, and goal directed  Thought Content:  Normal   Feeling Hopeless: absent  Suicidal Thoughts:  absent  Homicidal Thoughts:  absent  Perceptual Disturbance: no perceptual disturbance  Attention and Concentration:  good  Insight and Judgement:  good  Memory:  memory appears to be " intact    LABS:   Recent Results (from the past 168 hour(s))   [facility] Drug Screen    Collection Time: 07/10/18 12:16 PM   Result Value Ref Range    External Amphetamine Screen Urine Negative     Amphetamine Cut-Off 1,000     External Benzodiazepine Screen Urine Positive     Benzodiazipine Cut-Off 300     External Cocaine Screen Urine Negative     Cocaine Cut-Off 300     External THC Screen Urine Negative     THC Cut-Off 50     External Methadone Screen Urine Negative     Methadone Cut-Off 300     External Methamphetamine Screen Urine Negative     Methamphetamine Cut-Off 1,000     External Oxycodone Screen Urine Negative     Oxycodone Cut-Off 100     External Buprenorphine Screen Urine Negative     Buprenorphine Cut-Off 10     External MDMA Negative     MDMA Cut-Off 500     External Opiates Screen Urine Negative     Opiates Cut-Off 300        MEDICATION ISSUES: Have discussed with the patient the medications Risks, Benefits, and Side effects including potential falls, possible impaired driving and  metabolic adversities among others. No medication side effects or related complaints today.     TREATMENT PLAN/GOALS: Continue supportive psychotherapy efforts and medications as indicated.     Current Outpatient Prescriptions   Medication Sig Dispense Refill   • allopurinol (ZYLOPRIM) 300 MG tablet      • ALPRAZolam XR (XANAX XR) 0.5 MG 24 hr tablet Take one am and two pm.  90 tablet 5   • aspirin 81 MG EC tablet Take 81 mg by mouth Daily.     • bumetanide (BUMEX) 1 MG tablet TAKE 1 TABLET BY MOUTH ONCE A DAY IN THE MORNING AS DIRECTED BY YOUR PRESCRIBER FOR FLUID RETENTION     • Cholecalciferol (VITAMIN D PO) Take 50,000 Units by mouth Every 30 (Thirty) Days.     • citalopram (CELEXA) 20 MG tablet Take 1 tablet by mouth Every Night. 90 tablet 1   • levothyroxine (SYNTHROID, LEVOTHROID) 50 MCG tablet Take 50 mcg by mouth Daily.     • omeprazole (priLOSEC) 20 MG capsule Take 20 mg by mouth Daily As Needed.     •  polyethylene glycol (MIRALAX) powder As Needed.     • potassium citrate (UROCIT-K) 5 MEQ (540 MG) CR tablet Take  by mouth 3 (Three) Times a Day With Meals.     • QUEtiapine (SEROquel) 50 MG tablet One or two at hs. 180 tablet 1   • simvastatin (ZOCOR) 40 MG tablet      • spironolactone (ALDACTONE) 25 MG tablet TAKE 1 TABLET EVERY MORNING AS DIRECTED BY YOUR PRESCRIBER FOR FLUID AND BLOOD PRESSURE       No current facility-administered medications for this visit.        COLLATERAL PSYCHOTHERAPEUTIC INTERVENTION: patient not interested in additional psychotherapy.    RISK:      Encounter Diagnoses   Name Primary?   • Medication management Yes   • Dysthymic disorder        Return in about 6 months (around 1/10/2019).  Patient knows to call if symptoms worsen or fail to improve between appointments.     Dictated utilizing Dragon dictation

## 2018-07-17 RX ORDER — SPIRONOLACTONE 25 MG/1
25 TABLET ORAL DAILY
Qty: 30 TABLET | Refills: 1 | Status: SHIPPED | OUTPATIENT
Start: 2018-07-17 | End: 2020-06-19 | Stop reason: SDUPTHER

## 2018-07-17 RX ORDER — BUMETANIDE 1 MG/1
TABLET ORAL
Qty: 30 TABLET | Refills: 1 | Status: SHIPPED | OUTPATIENT
Start: 2018-07-17 | End: 2020-06-19 | Stop reason: SDUPTHER

## 2018-09-10 ENCOUNTER — HOSPITAL ENCOUNTER (OUTPATIENT)
Dept: MAMMOGRAPHY | Facility: HOSPITAL | Age: 63
Discharge: HOME | End: 2018-09-10
Admitting: FAMILY MEDICINE

## 2018-09-10 DIAGNOSIS — Z12.39 SCREENING BREAST EXAMINATION: ICD-10-CM

## 2018-09-10 PROCEDURE — 77063 BREAST TOMOSYNTHESIS BI: CPT | Performed by: RADIOLOGY

## 2018-09-10 PROCEDURE — 77067 SCR MAMMO BI INCL CAD: CPT

## 2018-09-10 PROCEDURE — 77063 BREAST TOMOSYNTHESIS BI: CPT

## 2018-09-10 PROCEDURE — 77067 SCR MAMMO BI INCL CAD: CPT | Performed by: RADIOLOGY

## 2018-09-17 RX ORDER — BUMETANIDE 1 MG/1
TABLET ORAL
Qty: 30 TABLET | Refills: 1 | OUTPATIENT
Start: 2018-09-17

## 2018-09-17 RX ORDER — SPIRONOLACTONE 25 MG/1
TABLET ORAL
Qty: 30 TABLET | Refills: 1 | OUTPATIENT
Start: 2018-09-17

## 2019-01-02 RX ORDER — ALPRAZOLAM 0.5 MG/1
TABLET, EXTENDED RELEASE ORAL
Qty: 90 TABLET | Refills: 0 | Status: SHIPPED | OUTPATIENT
Start: 2019-01-02 | End: 2019-02-01 | Stop reason: SDUPTHER

## 2019-02-01 NOTE — TELEPHONE ENCOUNTER
Patient will not be able to make appointment in February due to her  having a mass removed from adrenal gland so they rescheduled to March 28.

## 2019-02-04 RX ORDER — CITALOPRAM 20 MG/1
20 TABLET ORAL NIGHTLY
Qty: 30 TABLET | Refills: 0 | Status: SHIPPED | OUTPATIENT
Start: 2019-02-04 | End: 2019-03-28 | Stop reason: SDUPTHER

## 2019-02-04 RX ORDER — ALPRAZOLAM 0.5 MG/1
TABLET, EXTENDED RELEASE ORAL
Qty: 90 TABLET | Refills: 0 | Status: SHIPPED | OUTPATIENT
Start: 2019-02-04 | End: 2019-03-28 | Stop reason: SDUPTHER

## 2019-02-04 RX ORDER — QUETIAPINE FUMARATE 50 MG/1
TABLET, FILM COATED ORAL
Qty: 60 TABLET | Refills: 0 | Status: SHIPPED | OUTPATIENT
Start: 2019-02-04 | End: 2019-03-28 | Stop reason: SDUPTHER

## 2019-03-28 ENCOUNTER — OFFICE VISIT (OUTPATIENT)
Dept: PSYCHIATRY | Facility: CLINIC | Age: 64
End: 2019-03-28

## 2019-03-28 VITALS
SYSTOLIC BLOOD PRESSURE: 127 MMHG | DIASTOLIC BLOOD PRESSURE: 68 MMHG | HEIGHT: 63 IN | WEIGHT: 167.6 LBS | HEART RATE: 73 BPM | BODY MASS INDEX: 29.7 KG/M2

## 2019-03-28 DIAGNOSIS — F34.1 DYSTHYMIC DISORDER: Primary | ICD-10-CM

## 2019-03-28 PROCEDURE — 99213 OFFICE O/P EST LOW 20 MIN: CPT | Performed by: PSYCHIATRY & NEUROLOGY

## 2019-03-28 RX ORDER — ALPRAZOLAM 0.5 MG/1
TABLET, EXTENDED RELEASE ORAL
Qty: 90 TABLET | Refills: 3 | Status: SHIPPED | OUTPATIENT
Start: 2019-03-28 | End: 2019-07-15 | Stop reason: SDUPTHER

## 2019-03-28 RX ORDER — CITALOPRAM 20 MG/1
20 TABLET ORAL NIGHTLY
Qty: 30 TABLET | Refills: 3 | Status: SHIPPED | OUTPATIENT
Start: 2019-03-28 | End: 2019-07-15 | Stop reason: SDUPTHER

## 2019-03-28 RX ORDER — SUCRALFATE 1 G/1
TABLET ORAL
Refills: 3 | COMMUNITY
Start: 2019-03-25 | End: 2019-06-19

## 2019-03-28 RX ORDER — POTASSIUM CHLORIDE 20 MEQ/1
40 TABLET, EXTENDED RELEASE ORAL DAILY
Refills: 0 | COMMUNITY
Start: 2018-12-21 | End: 2020-06-19

## 2019-03-28 RX ORDER — QUETIAPINE FUMARATE 50 MG/1
TABLET, FILM COATED ORAL
Qty: 60 TABLET | Refills: 3 | Status: SHIPPED | OUTPATIENT
Start: 2019-03-28 | End: 2019-07-15 | Stop reason: SDUPTHER

## 2019-03-28 NOTE — PROGRESS NOTES
"Patient ID: Elsa Porras is a 64 y.o. female    SERVICE TYPE: EVALUATION AND MANAGEMENT (greater than 50% of the time spent for supportive psychotherapy).      /68   Pulse 73   Ht 160 cm (62.99\")   Wt 76 kg (167 lb 9.6 oz)   BMI 29.70 kg/m²     ALLERGIES:  Codeine; Isosorbide nitrate; Lorcet [hydrocodone-acetaminophen]; Other; Percocet [oxycodone-acetaminophen]; and Ultram [tramadol]    CC/ Focus of the visit: anxiety/ depression     HPI: no recurrence of significant persistent depressive symptoms, no weathering her 's illnesses and her own medical problems fairly effectively.  Interest activity relationships all at the relative norms.  No indication of any substance misuse or abuse    PFSH: Seen with her , home life stable, enjoying their grandchildren.    Review of Systems   Respiratory: Negative.    Cardiovascular: Negative.    Gastrointestinal:        Esophageal spasm  Rectocele prolapse.       Ventral hernia - repair pending (8 prior repairs).     SUPPORTIVE PSYCHOTHERAPY: continuing efforts to promote the therapeutic alliance, address the patient’s issues, and strengthen self awareness, insights, and coping skills.       Mental Status Exam  Appearance:  clean and casually dressed, appropriate  Attitude toward clinician:  cooperative and agreeable   Speech:    Rate:  regular rate and rhythm   Volume: normal  Motor:  no abnormal movements present  Mood:  Good  Affect:  euthymic  Thought Processes:  linear, logical, and goal directed  Thought Content:  Normal   Feeling Hopeless: absent  Suicidal Thoughts:  absent  Homicidal Thoughts:  absent  Perceptual Disturbance: no perceptual disturbance  Attention and Concentration:  good  Insight and Judgement:  good  Memory:  memory appears to be intact    LABS: No results found for this or any previous visit (from the past 168 hour(s)).    MEDICATION ISSUES: Have discussed with the patient the medications Risks, Benefits, and Side effects " including potential falls, possible impaired driving and  metabolic adversities among others. No medication side effects or related complaints today.     TREATMENT PLAN/GOALS: Continue supportive psychotherapy efforts and medications as indicated.     Current Outpatient Medications   Medication Sig Dispense Refill   • allopurinol (ZYLOPRIM) 300 MG tablet   2   • ALPRAZolam XR (XANAX XR) 0.5 MG 24 hr tablet TAKE 1 TABLET BY MOUTH EVERY MORNING AND 2 TABLETS BY MOUTH EVERY EVENING FOR ANXIETY 90 tablet 3   • aspirin 81 MG EC tablet Take 81 mg by mouth Daily.     • bumetanide (BUMEX) 1 MG tablet TAKE 1 TABLET BY MOUTH ONCE A DAY IN THE MORNING AS DIRECTED BY YOUR PRESCRIBER FOR FLUID RETENTION 30 tablet 1   • Cholecalciferol (VITAMIN D PO) Take 50,000 Units by mouth Every 30 (Thirty) Days.     • citalopram (CELEXA) 20 MG tablet Take 1 tablet by mouth Every Night. 30 tablet 3   • levothyroxine (SYNTHROID, LEVOTHROID) 50 MCG tablet Take 50 mcg by mouth Daily.     • omeprazole (priLOSEC) 20 MG capsule Take 20 mg by mouth Daily As Needed.     • polyethylene glycol (MIRALAX) powder As Needed.  0   • potassium chloride (K-DUR,KLOR-CON) 20 MEQ CR tablet TAKE ONE TABLET BY MOUTH EVERY DAY AS DIRECTED BY YOUR PRESCRIBER  0   • potassium citrate (UROCIT-K) 5 MEQ (540 MG) CR tablet Take  by mouth 3 (Three) Times a Day With Meals.     • QUEtiapine (SEROquel) 50 MG tablet One or two at hs. 60 tablet 3   • simvastatin (ZOCOR) 40 MG tablet   2   • spironolactone (ALDACTONE) 25 MG tablet Take 1 tablet by mouth Daily. 30 tablet 1   • sucralfate (CARAFATE) 1 g tablet TAKE 1 TABLET BY MOUTH TWO TO THREE TIMES A DAY AS DIRECTED BY YOUR PRESCRIBER  3     No current facility-administered medications for this visit.        COLLATERAL PSYCHOTHERAPEUTIC INTERVENTION:  patient not interested in additional psychotherapy.    RISK: Low to moderate    Assessment/Plan     Diagnoses and all orders for this visit:    Dysthymic disorder    Other  orders  -     citalopram (CELEXA) 20 MG tablet; Take 1 tablet by mouth Every Night.  -     ALPRAZolam XR (XANAX XR) 0.5 MG 24 hr tablet; TAKE 1 TABLET BY MOUTH EVERY MORNING AND 2 TABLETS BY MOUTH EVERY EVENING FOR ANXIETY  -     QUEtiapine (SEROquel) 50 MG tablet; One or two at hs.        Return in about 4 months (around 7/28/2019).         Patient knows to call if symptoms worsen or fail to improve between appointments.    Dictated utilizing [facility] dictation    SATURNINO Ibarra MD

## 2019-06-19 ENCOUNTER — CONSULT (OUTPATIENT)
Dept: CARDIOLOGY | Facility: CLINIC | Age: 64
End: 2019-06-19

## 2019-06-19 ENCOUNTER — HOSPITAL ENCOUNTER (OUTPATIENT)
Dept: CARDIOLOGY | Facility: HOSPITAL | Age: 64
Discharge: HOME | End: 2019-06-19
Admitting: PHYSICIAN ASSISTANT

## 2019-06-19 VITALS
SYSTOLIC BLOOD PRESSURE: 116 MMHG | WEIGHT: 168 LBS | HEART RATE: 60 BPM | BODY MASS INDEX: 29.77 KG/M2 | HEIGHT: 63 IN | DIASTOLIC BLOOD PRESSURE: 72 MMHG

## 2019-06-19 VITALS — HEIGHT: 63 IN | WEIGHT: 168 LBS | BODY MASS INDEX: 29.77 KG/M2

## 2019-06-19 DIAGNOSIS — R07.9 CHEST PAIN SYNDROME: Primary | ICD-10-CM

## 2019-06-19 DIAGNOSIS — I45.10 RBBB (RIGHT BUNDLE BRANCH BLOCK): ICD-10-CM

## 2019-06-19 DIAGNOSIS — I10 ESSENTIAL HYPERTENSION: ICD-10-CM

## 2019-06-19 DIAGNOSIS — R07.9 CHEST PAIN SYNDROME: ICD-10-CM

## 2019-06-19 LAB
BH CV ECHO MEAS - AO MAX PG (FULL): 3.8 MMHG
BH CV ECHO MEAS - AO MAX PG: 7.7 MMHG
BH CV ECHO MEAS - AO MEAN PG (FULL): 2 MMHG
BH CV ECHO MEAS - AO MEAN PG: 4 MMHG
BH CV ECHO MEAS - AO V2 MAX: 139 CM/SEC
BH CV ECHO MEAS - AO V2 MEAN: 97 CM/SEC
BH CV ECHO MEAS - AO V2 VTI: 34.6 CM
BH CV ECHO MEAS - AVA(I,A): 2.3 CM^2
BH CV ECHO MEAS - AVA(I,D): 2.3 CM^2
BH CV ECHO MEAS - AVA(V,A): 2.2 CM^2
BH CV ECHO MEAS - AVA(V,D): 2.2 CM^2
BH CV ECHO MEAS - BSA(HAYCOCK): 1.9 M^2
BH CV ECHO MEAS - BSA: 1.8 M^2
BH CV ECHO MEAS - BZI_BMI: 29.8 KILOGRAMS/M^2
BH CV ECHO MEAS - BZI_METRIC_HEIGHT: 160 CM
BH CV ECHO MEAS - BZI_METRIC_WEIGHT: 76.2 KG
BH CV ECHO MEAS - EDV(CUBED): 75.2 ML
BH CV ECHO MEAS - EDV(MOD-SP2): 69 ML
BH CV ECHO MEAS - EDV(MOD-SP4): 69 ML
BH CV ECHO MEAS - EDV(TEICH): 79.5 ML
BH CV ECHO MEAS - EF(CUBED): 77.4 %
BH CV ECHO MEAS - EF(MOD-BP): 64 %
BH CV ECHO MEAS - EF(MOD-SP2): 59.4 %
BH CV ECHO MEAS - EF(MOD-SP4): 66.7 %
BH CV ECHO MEAS - EF(TEICH): 69.9 %
BH CV ECHO MEAS - ESV(CUBED): 17 ML
BH CV ECHO MEAS - ESV(MOD-SP2): 28 ML
BH CV ECHO MEAS - ESV(MOD-SP4): 23 ML
BH CV ECHO MEAS - ESV(TEICH): 23.9 ML
BH CV ECHO MEAS - FS: 39.1 %
BH CV ECHO MEAS - IVS/LVPW: 0.94
BH CV ECHO MEAS - IVSD: 1 CM
BH CV ECHO MEAS - LA DIMENSION: 3.2 CM
BH CV ECHO MEAS - LAD MAJOR: 4.2 CM
BH CV ECHO MEAS - LAT PEAK E' VEL: 11 CM/SEC
BH CV ECHO MEAS - LATERAL E/E' RATIO: 9.4
BH CV ECHO MEAS - LV DIASTOLIC VOL/BSA (35-75): 38.4 ML/M^2
BH CV ECHO MEAS - LV MASS(C)D: 153.1 GRAMS
BH CV ECHO MEAS - LV MASS(C)DI: 85.3 GRAMS/M^2
BH CV ECHO MEAS - LV MAX PG: 3.9 MMHG
BH CV ECHO MEAS - LV MEAN PG: 2 MMHG
BH CV ECHO MEAS - LV SYSTOLIC VOL/BSA (12-30): 12.8 ML/M^2
BH CV ECHO MEAS - LV V1 MAX: 98.6 CM/SEC
BH CV ECHO MEAS - LV V1 MEAN: 66.1 CM/SEC
BH CV ECHO MEAS - LV V1 VTI: 24.9 CM
BH CV ECHO MEAS - LVIDD: 4.2 CM
BH CV ECHO MEAS - LVIDS: 2.6 CM
BH CV ECHO MEAS - LVLD AP2: 6.8 CM
BH CV ECHO MEAS - LVLD AP4: 6.8 CM
BH CV ECHO MEAS - LVLS AP2: 5.2 CM
BH CV ECHO MEAS - LVLS AP4: 5.3 CM
BH CV ECHO MEAS - LVOT AREA (M): 3.1 CM^2
BH CV ECHO MEAS - LVOT AREA: 3.1 CM^2
BH CV ECHO MEAS - LVOT DIAM: 2 CM
BH CV ECHO MEAS - LVPWD: 1.1 CM
BH CV ECHO MEAS - MED PEAK E' VEL: 8.1 CM/SEC
BH CV ECHO MEAS - MEDIAL E/E' RATIO: 12.8
BH CV ECHO MEAS - MV A MAX VEL: 52.8 CM/SEC
BH CV ECHO MEAS - MV DEC TIME: 0.19 SEC
BH CV ECHO MEAS - MV E MAX VEL: 103 CM/SEC
BH CV ECHO MEAS - MV E/A: 2
BH CV ECHO MEAS - PA ACC SLOPE: 344 CM/SEC^2
BH CV ECHO MEAS - PA ACC TIME: 0.19 SEC
BH CV ECHO MEAS - PA MAX PG: 3.9 MMHG
BH CV ECHO MEAS - PA PR(ACCEL): -8.3 MMHG
BH CV ECHO MEAS - PA V2 MAX: 99.1 CM/SEC
BH CV ECHO MEAS - RAP SYSTOLE: 3 MMHG
BH CV ECHO MEAS - RVSP: 21 MMHG
BH CV ECHO MEAS - SI(CUBED): 32.4 ML/M^2
BH CV ECHO MEAS - SI(LVOT): 43.6 ML/M^2
BH CV ECHO MEAS - SI(MOD-SP2): 22.8 ML/M^2
BH CV ECHO MEAS - SI(MOD-SP4): 25.6 ML/M^2
BH CV ECHO MEAS - SI(TEICH): 30.9 ML/M^2
BH CV ECHO MEAS - SV(CUBED): 58.2 ML
BH CV ECHO MEAS - SV(LVOT): 78.2 ML
BH CV ECHO MEAS - SV(MOD-SP2): 41 ML
BH CV ECHO MEAS - SV(MOD-SP4): 46 ML
BH CV ECHO MEAS - SV(TEICH): 55.6 ML
BH CV ECHO MEAS - TAPSE (>1.6): 2.4 CM2
BH CV ECHO MEAS - TR MAX PG: 18 MMHG
BH CV ECHO MEAS - TR MAX VEL: 212.8 CM/SEC
BH CV ECHO MEASUREMENTS AVERAGE E/E' RATIO: 10.79
BH CV VAS BP RIGHT ARM: NORMAL MMHG
BH CV XLRA - RV BASE: 2.5 CM
BH CV XLRA - RV LENGTH: 6.1 CM
BH CV XLRA - RV MID: 2.6 CM
BH CV XLRA - TDI S': 11 CM/SEC
LV EF 2D ECHO EST: 60 %

## 2019-06-19 PROCEDURE — 93306 TTE W/DOPPLER COMPLETE: CPT | Performed by: INTERNAL MEDICINE

## 2019-06-19 PROCEDURE — 93000 ELECTROCARDIOGRAM COMPLETE: CPT | Performed by: PHYSICIAN ASSISTANT

## 2019-06-19 PROCEDURE — 93306 TTE W/DOPPLER COMPLETE: CPT

## 2019-06-19 PROCEDURE — 99203 OFFICE O/P NEW LOW 30 MIN: CPT | Performed by: PHYSICIAN ASSISTANT

## 2019-06-19 RX ORDER — LISINOPRIL 10 MG/1
10 TABLET ORAL DAILY
COMMUNITY
End: 2020-06-19

## 2019-06-19 NOTE — PROGRESS NOTES
[facility]  INITIAL OFFICE CONSULT      Elsa Porras  1955  PCP: Arsenio Harris MD    SUBJECTIVE:   Elsa Porras is a 64 y.o. female seen for a consultation visit regarding the following:     Chief Complaint:   Chief Complaint   Patient presents with   • Cardiac Clearance        Consultation is requested by Dr. Logan with  OB/GYN for evaluation of Cardiac Clearance        History:  64-year-old female seen in evaluation today for preop clearance prior to rectocele surgical repair with [facility] at [facility].  Mrs. porras has a known past medical history of atypical chest pain with normal heart catheterization and negative stress test in 2016.  In addition she has known mitral valve prolapse with echocardiogram 2017 revealing mild mitral valve prolapse and moderate MR.  Mrs. Porras has not been seen in our office for the past 2 years but denies any significant symptoms suggesting angina pectoris.  She denies any heart failure symptoms.  She does have asthma symptoms at times with the humidity is very high or she is in the hot shower she become short of breath but otherwise she is able to do a lot of activities with her grandchildren and walked over 2 miles yesterday with no chest pain or chest tightness.  She denies any sustained palpitations dizziness near syncope or syncope.  She denies any recent hospitalizations.  She is having quite deal difficulty with pain with her rectocele and this will require surgical revision she request clearance prior to this procedure.  She states her blood pressure has been controlled as well as  Her cholesterol on Zocor.     Cardiac PMH: (Old records have been reviewed and summarized below)  1. Chest pain syndrome:   a. Previous heart catheterization with normal coronary arteries by Dr. Theodore, data-deficit.   b. Normal  pharmacologic MPS for recurrent chest pain, 2008.   c. Pharmacologic MPS for recurrent  chest pain, 04/10/2012.   d. Recurrent chest pain at [facility] ER presentation with negative enzymes and EKG, 07/29/2013.  e. Recurrent atypical chest pain,  July 2015.  f. Negative MPS 3/2016  2. Mitral valve disease:   a. Reportedly moderate mitral regurgitation on echocardiogram, 2009.   b. Echocardiogram, 02/21/2013:  LVEF 55% to 60%, trace MR.   c. Echocardiogram, 08/13/2015:  LVEF > than 65%. No hemodynamically significant valvular disease.  d. Echo 5/17 Normal EF MVP, Mod MR  3.  Right bundle branch block.   4. GERD/hiatal hernia/esophageal strictures.   5. Hypertension.   6. Hyperlipidemia.   7. Asthma  8. Fibromyalgia       Past Medical History, Past Surgical History, Family history, Social History, and Medications were all reviewed with the patient today and updated as necessary.     Current Outpatient Medications   Medication Sig Dispense Refill   • allopurinol (ZYLOPRIM) 300 MG tablet Daily.  2   • ALPRAZolam XR (XANAX XR) 0.5 MG 24 hr tablet TAKE 1 TABLET BY MOUTH EVERY MORNING AND 2 TABLETS BY MOUTH EVERY EVENING FOR ANXIETY 90 tablet 3   • aspirin 81 MG EC tablet Take 81 mg by mouth Daily.     • bumetanide (BUMEX) 1 MG tablet TAKE 1 TABLET BY MOUTH ONCE A DAY IN THE MORNING AS DIRECTED BY YOUR PRESCRIBER FOR FLUID RETENTION 30 tablet 1   • Cholecalciferol (VITAMIN D PO) Take 50,000 Units by mouth Every 30 (Thirty) Days.     • citalopram (CELEXA) 20 MG tablet Take 1 tablet by mouth Every Night. 30 tablet 3   • levothyroxine (SYNTHROID, LEVOTHROID) 50 MCG tablet Take 50 mcg by mouth Daily.     • lisinopril (PRINIVIL,ZESTRIL) 10 MG tablet Take 10 mg by mouth Daily.     • potassium chloride (K-DUR,KLOR-CON) 20 MEQ CR tablet TAKE ONE TABLET BY MOUTH EVERY DAY AS DIRECTED BY YOUR PRESCRIBER  0   • QUEtiapine (SEROquel) 50 MG tablet One or two at hs. 60 tablet 3   • simvastatin (ZOCOR) 40 MG tablet   2   • spironolactone (ALDACTONE) 25 MG tablet Take 1 tablet by mouth Daily. 30 tablet 1     No  current facility-administered medications for this visit.      Allergies   Allergen Reactions   • Codeine      gastrointestinal upset.      • Isosorbide Nitrate      MONONITRATE, migraines.    • Lorcet [Hydrocodone-Acetaminophen]       rash, nausea, vomiting.    • Other      DARVOCET, rash, nausea, vomiting,    • Percocet [Oxycodone-Acetaminophen]      rash, nausea and vomiting.   • Ultram [Tramadol]       rash, nausea, vomiting,          Past Medical History:   Diagnosis Date   • Anxiety and depression 2016   • Arthritis    • Asthma    • Bell's palsy    • Cancer (CMS/HCC)     skin   • Chest pain    • Esophageal stricture    • Fibromyalgia 2016   • GERD (gastroesophageal reflux disease) 2016   • Hiatal hernia    • Hyperlipidemia 2016   • Hypertension 2016   • Hypothyroidism 2016   • Inguinal hernia     left   • Migraines    • Mitral valve disease 2016   • Obesity 2016   • Panic disorder 2016   • PONV (postoperative nausea and vomiting)    • Reactive airway disease 2016     Past Surgical History:   Procedure Laterality Date   • ABDOMINAL SURGERY     • BREAST BIOPSY Right 2004    benign   • BREAST SURGERY      Right breast lumpectomy.    • CARDIAC CATHETERIZATION     •  SECTION     • CHOLECYSTECTOMY     • COLONOSCOPY     • COLONOSCOPY N/A 2016    Procedure: COLONOSCOPY;  Surgeon: Adrian Cruz MD;  Location: [facility];  Service:    • HERNIA REPAIR      multiple   • HYSTERECTOMY     • SKIN CANCER EXCISION       Family History   Problem Relation Age of Onset   • Hypertension Mother    • Cancer Father    • Hypertension Maternal Grandmother    • Heart disease Maternal Grandmother    • Hypertension Maternal Grandfather    • Heart disease Maternal Grandfather    • Breast cancer Maternal Aunt      Social History     Tobacco Use   • Smoking status: Never Smoker   • Smokeless tobacco: Never Used   Substance Use Topics   • Alcohol use: No       ROS:  Review of  "Symptoms:  General: no recent weight loss/gain, weakness or fatigue  Skin: no rashes, lumps, or other skin changes  HEENT: no dizziness, lightheadedness, or vision changes  Respiratory:+ Asthma  Cardiovascular: no palpitations, and tachycardia  Gastrointestinal: no black/tarry stools or diarrhea  Urinary: no change in frequency or urgency  Peripheral Vascular: no claudication or leg cramps  Musculoskeletal: +OA Knees  Psychiatric: no depression or excessive stress  Neurological: no sensory or motor loss, no syncope  Hematologic: no anemia, easy bruising or bleeding  Endocrine: on synthorid         PHYSICAL EXAM:   /72 (BP Location: Left arm, Patient Position: Sitting)   Pulse 60   Ht 160 cm (63\")   Wt 76.2 kg (168 lb)   BMI 29.76 kg/m²      Wt Readings from Last 5 Encounters:   06/19/19 76.2 kg (168 lb)   03/28/19 76 kg (167 lb 9.6 oz)   07/10/18 74.4 kg (164 lb)   01/11/18 74.4 kg (164 lb)   05/26/17 73.5 kg (162 lb)     BP Readings from Last 5 Encounters:   06/19/19 116/72   03/28/19 127/68   07/10/18 117/65   01/11/18 132/67   05/26/17 132/71       General-Well Nourished, Well developed  Eyes - PERRLA  Neck- supple, No mass  CV- regular rate and rhythm,2/6 HSM  Lung- clear bilaterally  Abd- soft, +BS  Musc/skel - Norm strength and range of motion  Skin- warm and dry  Neuro - Alert & Oriented x 3, appropriate mood.    Medical problems and test results were reviewed with the patient today.         EKG:  (EKG/Tracing has been independently visualized by me and summarized below)      ECG 12 Lead  Date/Time: 6/19/2019 9:50 AM  Performed by: Ravinder Burch PA  Authorized by: Ravinder Burch PA   Comparison: compared with previous ECG from 4/12/2017  Similar to previous ECG  Rhythm: sinus rhythm  Rate: normal  Conduction: right bundle branch block  QRS axis: normal  Other: no other findings    Clinical impression: abnormal EKG        Echo 2016        · Left ventricular systolic function is normal. " Estimated EF = 65%.  · Mitral valve prolapse of the anterior leaflet is present. There is mild to moderate mitral regurgitation.            ASSESSMENT   1. MVP:  Repeat Echocardiogram for review of LV fucntion.  2. HTN: Controlled  3. HLD: Statin  4. Hx of Atypical chest pain.  Normal remote LHC.  Negative MPS 2016, Normal EF  5. Chronic Asthma  6. Chronic RBBB      PLAN  · We will review repeat echocardiogram regarding mitral valve prolapse and LV function.  If this echocardiogram is not significantly changed she will be considered standard cardiovascular risk to pursue rectocele surgical revision.  · Continue to focus on risk factor medication including strict control of hypertension and dyslipidemia  · Return to follow-up Dr. Ga in 1 year or sooner as needed.       Cardiology/Electrophysiology  06/19/19  9:44 AM  Will Marcin VILLA

## 2019-06-21 ENCOUNTER — TELEPHONE (OUTPATIENT)
Dept: CARDIOLOGY | Facility: CLINIC | Age: 64
End: 2019-06-21

## 2019-06-21 ENCOUNTER — DOCUMENTATION (OUTPATIENT)
Dept: CARDIOLOGY | Facility: CLINIC | Age: 64
End: 2019-06-21

## 2019-06-21 NOTE — PROGRESS NOTES
Echo was reviewed no change from prior Echo, Patient will be considered standard CV risk for surgery.

## 2019-07-15 ENCOUNTER — OFFICE VISIT (OUTPATIENT)
Dept: PSYCHIATRY | Facility: CLINIC | Age: 64
End: 2019-07-15

## 2019-07-15 VITALS
HEIGHT: 63 IN | SYSTOLIC BLOOD PRESSURE: 121 MMHG | DIASTOLIC BLOOD PRESSURE: 59 MMHG | WEIGHT: 166 LBS | HEART RATE: 77 BPM | BODY MASS INDEX: 29.41 KG/M2

## 2019-07-15 DIAGNOSIS — F34.1 DYSTHYMIC DISORDER: Primary | ICD-10-CM

## 2019-07-15 PROCEDURE — 99213 OFFICE O/P EST LOW 20 MIN: CPT | Performed by: PSYCHIATRY & NEUROLOGY

## 2019-07-15 RX ORDER — ALPRAZOLAM 0.5 MG/1
TABLET, EXTENDED RELEASE ORAL
Qty: 80 TABLET | Refills: 3 | Status: SHIPPED | OUTPATIENT
Start: 2019-07-15 | End: 2019-11-05 | Stop reason: SDUPTHER

## 2019-07-15 RX ORDER — QUETIAPINE FUMARATE 50 MG/1
TABLET, FILM COATED ORAL
Qty: 60 TABLET | Refills: 3 | Status: SHIPPED | OUTPATIENT
Start: 2019-07-15 | End: 2019-11-05 | Stop reason: SDUPTHER

## 2019-07-15 RX ORDER — POLYETHYLENE GLYCOL 3350 17 G/17G
POWDER, FOR SOLUTION ORAL AS NEEDED
Refills: 2 | COMMUNITY
Start: 2019-07-02

## 2019-07-15 RX ORDER — CONJUGATED ESTROGENS 0.62 MG/G
CREAM VAGINAL
Refills: 3 | COMMUNITY
Start: 2019-07-02 | End: 2020-06-19

## 2019-07-15 RX ORDER — MONTELUKAST SODIUM 10 MG/1
TABLET ORAL
Refills: 0 | Status: ON HOLD | COMMUNITY
Start: 2019-07-02 | End: 2022-08-11

## 2019-07-15 RX ORDER — IBUPROFEN 800 MG/1
TABLET ORAL
Refills: 0 | COMMUNITY
Start: 2019-06-27 | End: 2021-03-24

## 2019-07-15 RX ORDER — CITALOPRAM 20 MG/1
20 TABLET ORAL NIGHTLY
Qty: 30 TABLET | Refills: 3 | Status: SHIPPED | OUTPATIENT
Start: 2019-07-15 | End: 2019-11-05 | Stop reason: SDUPTHER

## 2019-07-15 NOTE — PROGRESS NOTES
"Patient ID: Elsa Porras is a 64 y.o. female    SERVICE TYPE: EVALUATION AND MANAGEMENT (greater than 50% of the time spent for supportive psychotherapy).      /59   Pulse 77   Ht 160 cm (63\")   Wt 75.3 kg (166 lb)   BMI 29.41 kg/m²     ALLERGIES:  Codeine; Isosorbide nitrate; Levaquin [levofloxacin]; Lorcet [hydrocodone-acetaminophen]; Other; Percocet [oxycodone-acetaminophen]; and Ultram [tramadol]    CC/ Focus of the visit: depression/ anxiety.      HPI: \"Doing pretty good\".  No recurrence of significant sustained depressive symptomatology, minimal difficulties with anxiety, activities sleep interest alter relative norms.    Patient agrees to taper back the prescription for the extended release Xanax, certainly no history suggestive of any substance misuse or abuse.    PFSH: Home life stable, enjoys their 3-year-old grandson.    Review of Systems   Respiratory: Negative.    Cardiovascular: Negative.    Gastrointestinal: Negative.         Anticipating rectal prolapse surgery in August.    Musculoskeletal:        Recent cortisone injection both knees       SUPPORTIVE PSYCHOTHERAPY: continuing efforts to promote the therapeutic alliance, address the patient’s issues, and strengthen self awareness, insights, and coping skills.       Mental Status Exam  Appearance:  clean and casually dressed, appropriate  Attitude toward clinician:  cooperative and agreeable   Speech:    Rate:  regular rate and rhythm   Volume: normal  Motor:  no abnormal movements present  Mood:  Good  Affect:  euthymic  Thought Processes:  linear, logical, and goal directed  Thought Content:  Normal   Feeling Hopeless: absent  Suicidal Thoughts:  absent  Homicidal Thoughts:  absent  Perceptual Disturbance: no perceptual disturbance  Attention and Concentration:  good  Insight and Judgement:  good  Memory:  memory appears to be intact    LABS: No results found for this or any previous visit (from the past 168 hour(s)).    MEDICATION " ISSUES: Have discussed with the patient the medications Risks, Benefits, and Side effects including potential falls, possible impaired driving and  metabolic adversities among others. No medication side effects or related complaints today.     TREATMENT PLAN/GOALS: Continue supportive psychotherapy efforts and medications as indicated.     Current Outpatient Medications   Medication Sig Dispense Refill   • allopurinol (ZYLOPRIM) 300 MG tablet Daily.     • ALPRAZolam XR (XANAX XR) 0.5 MG 24 hr tablet TAKE 1 TABLET BY MOUTH EVERY MORNING AND 2 TABLETS BY MOUTH EVERY EVENING FOR ANXIETY 80 tablet 3   • aspirin 81 MG EC tablet Take 81 mg by mouth Daily.     • bumetanide (BUMEX) 1 MG tablet TAKE 1 TABLET BY MOUTH ONCE A DAY IN THE MORNING AS DIRECTED BY YOUR PRESCRIBER FOR FLUID RETENTION     • Cholecalciferol (VITAMIN D PO) Take 50,000 Units by mouth Every 30 (Thirty) Days.     • citalopram (CELEXA) 20 MG tablet Take 1 tablet by mouth Every Night. 30 tablet 3   • ibuprofen (ADVIL,MOTRIN) 800 MG tablet TAKE 1 TABLET BY MOUTH THREE TIMES A DAY WITH FOOD AS DIRECTED BY YOUR PRESCRIBER AS NEEDED FOR PAIN OR INFLAMMATION     • levothyroxine (SYNTHROID, LEVOTHROID) 50 MCG tablet Take 50 mcg by mouth Daily.     • lisinopril (PRINIVIL,ZESTRIL) 10 MG tablet Take 10 mg by mouth Daily.     • montelukast (SINGULAIR) 10 MG tablet TAKE 1 TABLET BY MOUTH ONCE A DAY AT BEDTIME AS DIRECTED BY YOUR PRESCRIBER TO PREVENT ASTHMA ATTACKS OR ALLERGIES.     • polyethylene glycol (MIRALAX) powder PLACE 17 GRAMS IN JUICE OR WATER THEN STIR AND DRINK ONCE A DAY FOR CONSTIPATION     • potassium chloride (K-DUR,KLOR-CON) 20 MEQ CR tablet TAKE ONE TABLET BY MOUTH EVERY DAY AS DIRECTED BY YOUR PRESCRIBER     • PREMARIN 0.625 MG/GM vaginal cream APPLY A PEA SIZE AMOUNT VAGINALLY FOR 2 WEEKS THEN TWICE WEEKLY THERE AFTER     • QUEtiapine (SEROquel) 50 MG tablet One or two at hs. 60 tablet 3   • simvastatin (ZOCOR) 40 MG tablet      • spironolactone  (ALDACTONE) 25 MG tablet Take 1 tablet by mouth Daily. 30 tablet      No current facility-administered medications for this visit.        COLLATERAL PSYCHOTHERAPEUTIC INTERVENTION:  patient not interested in additional psychotherapy.    RISK:  low    Assessment/Plan     Diagnoses and all orders for this visit:    Dysthymic disorder    Other orders  -     ALPRAZolam XR (XANAX XR) 0.5 MG 24 hr tablet; TAKE 1 TABLET BY MOUTH EVERY MORNING AND 1-2 TABLETS BY MOUTH EVERY EVENING FOR ANXIETY  -     citalopram (CELEXA) 20 MG tablet; Take 1 tablet by mouth Every Night.  -     QUEtiapine (SEROquel) 50 MG tablet; One or two at hs.        Return in about 4 months (around 11/15/2019).         Patient knows to call if symptoms worsen or fail to improve between appointments.    Dictated utilizing [employer] dictation    SATURNINO Ibarra MD

## 2019-09-13 ENCOUNTER — APPOINTMENT (OUTPATIENT)
Dept: MAMMOGRAPHY | Facility: HOSPITAL | Age: 64
End: 2019-09-13

## 2019-10-07 ENCOUNTER — APPOINTMENT (OUTPATIENT)
Dept: MAMMOGRAPHY | Facility: HOSPITAL | Age: 64
End: 2019-10-07

## 2019-11-05 ENCOUNTER — OFFICE VISIT (OUTPATIENT)
Dept: PSYCHIATRY | Facility: CLINIC | Age: 64
End: 2019-11-05

## 2019-11-05 VITALS
DIASTOLIC BLOOD PRESSURE: 71 MMHG | SYSTOLIC BLOOD PRESSURE: 132 MMHG | HEIGHT: 63 IN | BODY MASS INDEX: 29.77 KG/M2 | HEART RATE: 77 BPM | WEIGHT: 168 LBS

## 2019-11-05 DIAGNOSIS — F34.1 DYSTHYMIC DISORDER: Primary | ICD-10-CM

## 2019-11-05 PROCEDURE — 99213 OFFICE O/P EST LOW 20 MIN: CPT | Performed by: PSYCHIATRY & NEUROLOGY

## 2019-11-05 RX ORDER — CITALOPRAM 20 MG/1
20 TABLET ORAL NIGHTLY
Qty: 30 TABLET | Refills: 3 | Status: SHIPPED | OUTPATIENT
Start: 2019-11-05 | End: 2020-02-25 | Stop reason: SDUPTHER

## 2019-11-05 RX ORDER — QUETIAPINE FUMARATE 50 MG/1
TABLET, FILM COATED ORAL
Qty: 60 TABLET | Refills: 3 | Status: SHIPPED | OUTPATIENT
Start: 2019-11-05 | End: 2020-02-25 | Stop reason: SDUPTHER

## 2019-11-05 RX ORDER — ERGOCALCIFEROL 1.25 MG/1
CAPSULE ORAL
Refills: 0 | COMMUNITY
Start: 2019-10-15 | End: 2020-06-19 | Stop reason: SDUPTHER

## 2019-11-05 RX ORDER — ALPRAZOLAM 0.5 MG/1
TABLET, EXTENDED RELEASE ORAL
Qty: 90 TABLET | Refills: 3 | Status: SHIPPED | OUTPATIENT
Start: 2019-11-05 | End: 2020-02-25 | Stop reason: SDUPTHER

## 2019-11-05 NOTE — PROGRESS NOTES
"Patient ID: Elsa Porras is a 64 y.o. female    SERVICE TYPE: EVALUATION AND MANAGEMENT (greater than 50% of the time spent for supportive psychotherapy).      /71   Pulse 77   Ht 160 cm (63\")   Wt 76.2 kg (168 lb)   BMI 29.76 kg/m²     ALLERGIES:  Codeine; Isosorbide nitrate; Levaquin [levofloxacin]; Lorcet [hydrocodone-acetaminophen]; Other; Percocet [oxycodone-acetaminophen]; and Ultram [tramadol]    CC/ Focus of the visit: anxiety and depression.      HPI: Stuggling alone with the August surgery and death of a sister with an OD in July. Has been more anxious and depressed. Fatigue, weakness, nausea, social avoidance (does attend [employer] of Sundays). No hopelessness or SI. \"Just aggravated cause I feel so bad physically\".   No opiates. Sleeps ok with the current medication.     Fears the worse for her GI distress (see below).     PFSH: home life supportive and stable. .     Review of Systems   Gastrointestinal:        GERD - episode of severe chest pain during session, cardiac evaluation yesterday cleared that concern. Scheduled to see a GI and been RX Protonix. Had CT scan yesterday.    Genitourinary: Positive for pelvic pain.        Pelvic surgery this past August.    Musculoskeletal: Positive for arthralgias.       SUPPORTIVE PSYCHOTHERAPY: continuing efforts to promote the therapeutic alliance, address the patient’s issues, and strengthen self awareness, insights, and coping skills.       Mental Status Exam  Appearance:  clean and casually dressed, appropriate  Attitude toward clinician:  cooperative and agreeable   Speech:    Rate:  regular rate and rhythm   Volume: normal  Motor:  no abnormal movements present  Mood:  Good  Affect:  euthymic  Thought Processes:  linear, logical, and goal directed  Thought Content:  Normal   Feeling Hopeless: absent  Suicidal Thoughts:  absent  Homicidal Thoughts:  absent  Perceptual Disturbance: no perceptual disturbance  Attention and Concentration:  " good  Insight and Judgement:  good  Memory:  memory appears to be intact    LABS: No results found for this or any previous visit (from the past 168 hour(s)).    MEDICATION ISSUES: Have discussed with the patient the medications Risks, Benefits, and Side effects including potential falls, possible impaired driving and  metabolic adversities among others. No medication side effects or related complaints today.     TREATMENT PLAN/GOALS: Continue supportive psychotherapy efforts and medications as indicated.     Current Outpatient Medications   Medication Sig Dispense Refill   • allopurinol (ZYLOPRIM) 300 MG tablet Daily.  2   • ALPRAZolam XR (XANAX XR) 0.5 MG 24 hr tablet TAKE 1 TABLET BY MOUTH EVERY MORNING AND 2 TABLETS BY MOUTH EVERY EVENING FOR ANXIETY 90 tablet 3   • aspirin 81 MG EC tablet Take 81 mg by mouth Daily.     • bumetanide (BUMEX) 1 MG tablet TAKE 1 TABLET BY MOUTH ONCE A DAY IN THE MORNING AS DIRECTED BY YOUR PRESCRIBER FOR FLUID RETENTION 30 tablet 1   • Cholecalciferol (VITAMIN D PO) Take 50,000 Units by mouth Every 30 (Thirty) Days.     • citalopram (CELEXA) 20 MG tablet Take 1 tablet by mouth Every Night. 30 tablet 3   • ibuprofen (ADVIL,MOTRIN) 800 MG tablet TAKE 1 TABLET BY MOUTH THREE TIMES A DAY WITH FOOD AS DIRECTED BY YOUR PRESCRIBER AS NEEDED FOR PAIN OR INFLAMMATION  0   • levothyroxine (SYNTHROID, LEVOTHROID) 50 MCG tablet Take 50 mcg by mouth Daily.     • lisinopril (PRINIVIL,ZESTRIL) 10 MG tablet Take 10 mg by mouth Daily.     • montelukast (SINGULAIR) 10 MG tablet TAKE 1 TABLET BY MOUTH ONCE A DAY AT BEDTIME AS DIRECTED BY YOUR PRESCRIBER TO PREVENT ASTHMA ATTACKS OR ALLERGIES.  0   • polyethylene glycol (MIRALAX) powder PLACE 17 GRAMS IN JUICE OR WATER THEN STIR AND DRINK ONCE A DAY FOR CONSTIPATION  2   • potassium chloride (K-DUR,KLOR-CON) 20 MEQ CR tablet TAKE ONE TABLET BY MOUTH EVERY DAY AS DIRECTED BY YOUR PRESCRIBER  0   • PREMARIN 0.625 MG/GM vaginal cream APPLY A PEA SIZE  AMOUNT VAGINALLY FOR 2 WEEKS THEN TWICE WEEKLY THERE AFTER  3   • QUEtiapine (SEROquel) 50 MG tablet One or two at hs. 60 tablet 3   • simvastatin (ZOCOR) 40 MG tablet   2   • spironolactone (ALDACTONE) 25 MG tablet Take 1 tablet by mouth Daily. 30 tablet 1   • vitamin D (ERGOCALCIFEROL) 1.25 MG (35626 UT) capsule capsule TAKE 1 CAPSULE BY MOUTH ONCE A WEEK FOR 1 MONTH THEN ONCE A MONTH THEREAFTER AS DIRECTED BY YOUR PRESCRIBER FOR VITAMIN D DEFICIENCY  0     No current facility-administered medications for this visit.        COLLATERAL PSYCHOTHERAPEUTIC INTERVENTION:  patient not interested in additional psychotherapy.    RISK:  Low to moderate    Assessment/Plan     Diagnoses and all orders for this visit:    Dysthymic disorder  -     ALPRAZolam XR (XANAX XR) 0.5 MG 24 hr tablet; TAKE 1 TABLET BY MOUTH EVERY MORNING AND 2 TABLETS BY MOUTH EVERY EVENING FOR ANXIETY  -     citalopram (CELEXA) 20 MG tablet; Take 1 tablet by mouth Every Night.  -     QUEtiapine (SEROquel) 50 MG tablet; One or two at hs.        Return in about 4 months (around 3/5/2020).           Patient knows to call if symptoms worsen or fail to improve between appointments.     I spent a total of 23 minutes in direct patient care, greater than 18 minutes (greater than 50%) were spent face-to-face with assessment, coordination care, and counseling the patient regarding her anxiety/depression and answering any questions the patient had about the medication and plan..      Dictated utilizing [employer] edgar Ibarra MD

## 2019-11-15 ENCOUNTER — HOSPITAL ENCOUNTER (OUTPATIENT)
Dept: MAMMOGRAPHY | Facility: HOSPITAL | Age: 64
Discharge: HOME | End: 2019-11-15
Admitting: FAMILY MEDICINE

## 2019-11-15 DIAGNOSIS — Z12.31 VISIT FOR SCREENING MAMMOGRAM: ICD-10-CM

## 2019-11-15 PROCEDURE — 77067 SCR MAMMO BI INCL CAD: CPT | Performed by: RADIOLOGY

## 2019-11-15 PROCEDURE — 77063 BREAST TOMOSYNTHESIS BI: CPT | Performed by: RADIOLOGY

## 2019-11-15 PROCEDURE — 77063 BREAST TOMOSYNTHESIS BI: CPT

## 2019-11-15 PROCEDURE — 77067 SCR MAMMO BI INCL CAD: CPT

## 2020-02-12 ENCOUNTER — TELEPHONE (OUTPATIENT)
Dept: CARDIOLOGY | Facility: CLINIC | Age: 65
End: 2020-02-12

## 2020-02-12 DIAGNOSIS — R07.89 OTHER CHEST PAIN: Primary | ICD-10-CM

## 2020-02-12 NOTE — TELEPHONE ENCOUNTER
Patient called reporting she was having shortness of breath with exertion and chest tightness. She saw Will and had an echo 6/2019- with mild MR & TR, EF 60%. OK to order stress test?

## 2020-02-25 ENCOUNTER — OFFICE VISIT (OUTPATIENT)
Dept: PSYCHIATRY | Facility: CLINIC | Age: 65
End: 2020-02-25

## 2020-02-25 VITALS
BODY MASS INDEX: 31.01 KG/M2 | SYSTOLIC BLOOD PRESSURE: 132 MMHG | HEIGHT: 63 IN | HEART RATE: 84 BPM | WEIGHT: 175 LBS | DIASTOLIC BLOOD PRESSURE: 71 MMHG

## 2020-02-25 DIAGNOSIS — F34.1 DYSTHYMIC DISORDER: ICD-10-CM

## 2020-02-25 PROCEDURE — 99214 OFFICE O/P EST MOD 30 MIN: CPT | Performed by: PSYCHIATRY & NEUROLOGY

## 2020-02-25 RX ORDER — QUETIAPINE FUMARATE 50 MG/1
TABLET, FILM COATED ORAL
Qty: 60 TABLET | Refills: 3 | Status: SHIPPED | OUTPATIENT
Start: 2020-02-25 | End: 2020-06-15 | Stop reason: SDUPTHER

## 2020-02-25 RX ORDER — CITALOPRAM 20 MG/1
20 TABLET ORAL NIGHTLY
Qty: 30 TABLET | Refills: 3 | Status: SHIPPED | OUTPATIENT
Start: 2020-02-25 | End: 2020-06-15 | Stop reason: SDUPTHER

## 2020-02-25 RX ORDER — NITROGLYCERIN 0.4 MG/1
TABLET SUBLINGUAL
COMMUNITY
Start: 2020-02-13 | End: 2021-03-12 | Stop reason: SDUPTHER

## 2020-02-25 RX ORDER — ALPRAZOLAM 0.5 MG/1
TABLET, EXTENDED RELEASE ORAL
Qty: 90 TABLET | Refills: 3 | Status: SHIPPED | OUTPATIENT
Start: 2020-02-25 | End: 2020-06-15 | Stop reason: SDUPTHER

## 2020-02-25 RX ORDER — PREDNISONE 10 MG/1
TABLET ORAL
COMMUNITY
Start: 2020-02-24 | End: 2020-06-19

## 2020-02-25 RX ORDER — DOXYCYCLINE HYCLATE 100 MG
TABLET ORAL
COMMUNITY
Start: 2020-02-24 | End: 2020-06-19

## 2020-02-25 RX ORDER — FAMOTIDINE 20 MG/1
20 TABLET, FILM COATED ORAL DAILY
COMMUNITY
Start: 2020-02-19 | End: 2021-12-03

## 2020-02-25 RX ORDER — POLYETHYLENE GLYCOL 3350 17 G/17G
POWDER, FOR SOLUTION ORAL
COMMUNITY
Start: 2020-02-05 | End: 2020-02-25 | Stop reason: SDUPTHER

## 2020-02-25 NOTE — PROGRESS NOTES
"Patient ID: Elsa Porras is a 65 y.o. female    SERVICE TYPE: EVALUATION AND MANAGEMENT (greater than 50% of the time spent for supportive psychotherapy).      /71   Pulse 84   Ht 160 cm (62.99\")   Wt 79.4 kg (175 lb)   BMI 31.01 kg/m²     ALLERGIES:  Codeine; Isosorbide nitrate; Levaquin [levofloxacin]; Lorcet [hydrocodone-acetaminophen]; Other; Percocet [oxycodone-acetaminophen]; and Ultram [tramadol]    CC/ Focus of the visit: Depression/anxiety/somatization    HPI: Lacks interest, hypersomnia, mildly depressed. Worries about her multiple physical issues that are lingering. \"Too much going baby\".  Patient has a long list of somatic issues highlighted by weakness and chronic fatigue, diagnosed for the fourth time with mononucleosis, having problems with cystocele, headaches, chest pain.  From a psychiatric perspective fairly stable, close to her baseline although setback by her physical complaints    No indication of drug misuse or abuse.    PFSH: Home with her , recently stable environment without turmoil, continues to enjoy the 3-year-old grandson who gives her and her  purpose.  Grandson recently has had a series of URIs.     ROS: See HPI. Headaches, weakness, fatigue, chest pain, back pain, arthralgias, bladder issues.     SUPPORTIVE PSYCHOTHERAPY: continuing efforts to promote the therapeutic alliance, address the patient’s issues, and strengthen self awareness, insights, and coping skills.       Mental Status Exam  Appearance:  clean and casually dressed, appropriate  Attitude toward clinician:  cooperative and agreeable   Speech:    Rate:  regular rate and rhythm   Volume: normal  Motor:  no abnormal movements present  Mood:  Good  Affect:  euthymic  Thought Processes:  linear, logical, and goal directed  Thought Content:  Normal   Feeling Hopeless: absent  Suicidal Thoughts:  absent  Homicidal Thoughts:  absent  Perceptual Disturbance: no perceptual disturbance  Attention and " Concentration:  good  Insight and Judgement:  good  Memory:  memory appears to be intact    LABS: No results found for this or any previous visit (from the past 168 hour(s)).    MEDICATION ISSUES: Have discussed with the patient the medications Risks, Benefits, and Side effects including potential falls, possible impaired driving and  metabolic adversities among others. No medication side effects or related complaints today.     TREATMENT PLAN/GOALS: Continue supportive psychotherapy efforts and medications as indicated.     Current Outpatient Medications   Medication Sig Dispense Refill   • allopurinol (ZYLOPRIM) 300 MG tablet Daily.  2   • ALPRAZolam XR (XANAX XR) 0.5 MG 24 hr tablet TAKE 1 TABLET BY MOUTH EVERY MORNING AND 2 TABLETS BY MOUTH EVERY EVENING FOR ANXIETY 90 tablet 3   • aspirin 81 MG EC tablet Take 81 mg by mouth Daily.     • bumetanide (BUMEX) 1 MG tablet TAKE 1 TABLET BY MOUTH ONCE A DAY IN THE MORNING AS DIRECTED BY YOUR PRESCRIBER FOR FLUID RETENTION 30 tablet 1   • Cholecalciferol (VITAMIN D PO) Take 50,000 Units by mouth Every 30 (Thirty) Days.     • citalopram (CELEXA) 20 MG tablet Take 1 tablet by mouth Every Night. 30 tablet 3   • doxycycline (VIBRAMYICN) 100 MG tablet      • famotidine (PEPCID) 20 MG tablet      • levothyroxine (SYNTHROID, LEVOTHROID) 50 MCG tablet Take 50 mcg by mouth Daily.     • montelukast (SINGULAIR) 10 MG tablet TAKE 1 TABLET BY MOUTH ONCE A DAY AT BEDTIME AS DIRECTED BY YOUR PRESCRIBER TO PREVENT ASTHMA ATTACKS OR ALLERGIES.  0   • nitroglycerin (NITROSTAT) 0.4 MG SL tablet      • polyethylene glycol (MIRALAX) powder PLACE 17 GRAMS IN JUICE OR WATER THEN STIR AND DRINK ONCE A DAY FOR CONSTIPATION  2   • potassium chloride (K-DUR,KLOR-CON) 20 MEQ CR tablet TAKE ONE TABLET BY MOUTH EVERY DAY AS DIRECTED BY YOUR PRESCRIBER  0   • predniSONE (DELTASONE) 10 MG tablet      • QUEtiapine (SEROquel) 50 MG tablet One or two at hs. 60 tablet 3   • simvastatin (ZOCOR) 40 MG  tablet   2   • spironolactone (ALDACTONE) 25 MG tablet Take 1 tablet by mouth Daily. 30 tablet 1   • vitamin D (ERGOCALCIFEROL) 1.25 MG (68584 UT) capsule capsule TAKE 1 CAPSULE BY MOUTH ONCE A WEEK FOR 1 MONTH THEN ONCE A MONTH THEREAFTER AS DIRECTED BY YOUR PRESCRIBER FOR VITAMIN D DEFICIENCY  0   • ibuprofen (ADVIL,MOTRIN) 800 MG tablet TAKE 1 TABLET BY MOUTH THREE TIMES A DAY WITH FOOD AS DIRECTED BY YOUR PRESCRIBER AS NEEDED FOR PAIN OR INFLAMMATION  0   • lisinopril (PRINIVIL,ZESTRIL) 10 MG tablet Take 10 mg by mouth Daily.     • PREMARIN 0.625 MG/GM vaginal cream APPLY A PEA SIZE AMOUNT VAGINALLY FOR 2 WEEKS THEN TWICE WEEKLY THERE AFTER  3     No current facility-administered medications for this visit.        COLLATERAL PSYCHOTHERAPEUTIC INTERVENTION:  patient not interested in additional psychotherapy.    RISK:  Moderate.     Assessment/Plan     Diagnoses and all orders for this visit:    Dysthymic disorder  -     ALPRAZolam XR (XANAX XR) 0.5 MG 24 hr tablet; TAKE 1 TABLET BY MOUTH EVERY MORNING AND 2 TABLETS BY MOUTH EVERY EVENING FOR ANXIETY  -     citalopram (CELEXA) 20 MG tablet; Take 1 tablet by mouth Every Night.  -     QUEtiapine (SEROquel) 50 MG tablet; One or two at hs.        Return in about 4 months (around 6/25/2020).           Patient knows to call if symptoms worsen or fail to improve between appointments.     I spent a total of 30 minutes in direct patient care, greater than 28 minutes (greater than 50%) were spent face-to-face with assessment, coordination care, and counseling the patient regarding depression and anxiety and answering any questions the patient had about the medication and plan..      Dictated utilizing [employer] dictcamden Ibarra MD

## 2020-06-15 ENCOUNTER — TELEMEDICINE (OUTPATIENT)
Dept: PSYCHIATRY | Facility: CLINIC | Age: 65
End: 2020-06-15

## 2020-06-15 DIAGNOSIS — F34.1 DYSTHYMIC DISORDER: Primary | ICD-10-CM

## 2020-06-15 PROCEDURE — 99214 OFFICE O/P EST MOD 30 MIN: CPT | Performed by: PSYCHIATRY & NEUROLOGY

## 2020-06-15 RX ORDER — ALPRAZOLAM 0.5 MG/1
TABLET, EXTENDED RELEASE ORAL
Qty: 90 TABLET | Refills: 5 | Status: SHIPPED | OUTPATIENT
Start: 2020-06-15 | End: 2020-12-15 | Stop reason: SDUPTHER

## 2020-06-15 RX ORDER — QUETIAPINE FUMARATE 50 MG/1
TABLET, FILM COATED ORAL
Qty: 180 TABLET | Refills: 1 | Status: SHIPPED | OUTPATIENT
Start: 2020-06-15 | End: 2020-12-15 | Stop reason: SDUPTHER

## 2020-06-15 RX ORDER — CITALOPRAM 20 MG/1
20 TABLET ORAL NIGHTLY
Qty: 90 TABLET | Refills: 1 | Status: SHIPPED | OUTPATIENT
Start: 2020-06-15 | End: 2020-12-15 | Stop reason: SDUPTHER

## 2020-06-15 NOTE — PROGRESS NOTES
This patient visit is electronic.  The patient gave consent to be seen remotely, and when consent is given they understand that the consent allows for patient identifiable information to be sent to a third party as needed.   They may refuse to be seen remotely at any time. The electronic data is encrypted and password protected, and the patient has been advised of the potential risks to privacy not withstanding such measures.    The patient is located at her home in [facility].    Clinic visit by phone due to lack of alternative.        Patient ID: Elsa Porras is a 65 y.o. female    SERVICE TYPE: EVALUATION AND MANAGEMENT (greater than 50% of the time spent for supportive psychotherapy).      There were no vitals taken for this visit.    ALLERGIES:  Codeine; Isosorbide nitrate; Levaquin [levofloxacin]; Lorcet [hydrocodone-acetaminophen]; Other; Percocet [oxycodone-acetaminophen]; and Ultram [tramadol]    CC/ Focus of the visit: Depression/anxiety    HPI: Patient reports that emotionally she is stable without recurrence of significant depressive symptomatology nor imposing anxiety.  Interest activity sleep all at the relative norms.  Having significant medical issues with hypertension, perhaps renal insufficiency undergoing medical tests that included a stress test scheduled next week.    PFSH: Home life stable.    Review of Systems   Constitutional:        Fluid retention   Respiratory: Positive for shortness of breath.    Cardiovascular: Negative.    Neurological: Negative.        SUPPORTIVE PSYCHOTHERAPY: continuing efforts to promote the therapeutic alliance, address the patient’s issues, and strengthen self awareness, insights, and coping skills.       Mental Status Exam  Appearance:    Attitude toward clinician:  cooperative and agreeable   Speech:    Rate:  regular rate and rhythm   Volume: normal  Motor:  no abnormal movements reported mood:  Good  Affect:  euthymic  Thought Processes:   linear, logical, and goal directed  Thought Content:  Normal   Feeling Hopeless: absent  Suicidal Thoughts:  absent  Homicidal Thoughts:  absent  Perceptual Disturbance: no perceptual disturbance  Attention and Concentration:  good  Insight and Judgement:  good  Memory:  memory appears to be intact    LABS: No results found for this or any previous visit (from the past 168 hour(s)).    MEDICATION ISSUES: Have discussed with the patient the medications Risks, Benefits, and Side effects including potential falls, possible impaired driving and  metabolic adversities among others. No medication side effects or related complaints today.     TREATMENT PLAN/GOALS: Continue supportive psychotherapy efforts and medications as indicated.     Current Outpatient Medications   Medication Sig Dispense Refill   • allopurinol (ZYLOPRIM) 300 MG tablet Daily.  2   • ALPRAZolam XR (XANAX XR) 0.5 MG 24 hr tablet TAKE 1 TABLET BY MOUTH EVERY MORNING AND 2 TABLETS BY MOUTH EVERY EVENING FOR ANXIETY 90 tablet 5   • aspirin 81 MG EC tablet Take 81 mg by mouth Daily.     • bumetanide (BUMEX) 1 MG tablet TAKE 1 TABLET BY MOUTH ONCE A DAY IN THE MORNING AS DIRECTED BY YOUR PRESCRIBER FOR FLUID RETENTION 30 tablet 1   • Cholecalciferol (VITAMIN D PO) Take 50,000 Units by mouth Every 30 (Thirty) Days.     • citalopram (CeleXA) 20 MG tablet Take 1 tablet by mouth Every Night. 90 tablet 1   • doxycycline (VIBRAMYICN) 100 MG tablet      • famotidine (PEPCID) 20 MG tablet      • ibuprofen (ADVIL,MOTRIN) 800 MG tablet TAKE 1 TABLET BY MOUTH THREE TIMES A DAY WITH FOOD AS DIRECTED BY YOUR PRESCRIBER AS NEEDED FOR PAIN OR INFLAMMATION  0   • levothyroxine (SYNTHROID, LEVOTHROID) 50 MCG tablet Take 50 mcg by mouth Daily.     • lisinopril (PRINIVIL,ZESTRIL) 10 MG tablet Take 10 mg by mouth Daily.     • montelukast (SINGULAIR) 10 MG tablet TAKE 1 TABLET BY MOUTH ONCE A DAY AT BEDTIME AS DIRECTED BY YOUR PRESCRIBER TO PREVENT ASTHMA ATTACKS OR ALLERGIES.   0   • nitroglycerin (NITROSTAT) 0.4 MG SL tablet      • polyethylene glycol (MIRALAX) powder PLACE 17 GRAMS IN JUICE OR WATER THEN STIR AND DRINK ONCE A DAY FOR CONSTIPATION  2   • potassium chloride (K-DUR,KLOR-CON) 20 MEQ CR tablet TAKE ONE TABLET BY MOUTH EVERY DAY AS DIRECTED BY YOUR PRESCRIBER  0   • predniSONE (DELTASONE) 10 MG tablet      • PREMARIN 0.625 MG/GM vaginal cream APPLY A PEA SIZE AMOUNT VAGINALLY FOR 2 WEEKS THEN TWICE WEEKLY THERE AFTER  3   • QUEtiapine (SEROquel) 50 MG tablet One or two at hs. 180 tablet 1   • simvastatin (ZOCOR) 40 MG tablet   2   • spironolactone (ALDACTONE) 25 MG tablet Take 1 tablet by mouth Daily. 30 tablet 1   • vitamin D (ERGOCALCIFEROL) 1.25 MG (23775 UT) capsule capsule TAKE 1 CAPSULE BY MOUTH ONCE A WEEK FOR 1 MONTH THEN ONCE A MONTH THEREAFTER AS DIRECTED BY YOUR PRESCRIBER FOR VITAMIN D DEFICIENCY  0     No current facility-administered medications for this visit.       Dev reviewed.    COLLATERAL PSYCHOTHERAPEUTIC INTERVENTION:  patient not interested in additional psychotherapy.    RISK: Low to moderate    Assessment/Plan     Diagnoses and all orders for this visit:    Dysthymic disorder  -     ALPRAZolam XR (XANAX XR) 0.5 MG 24 hr tablet; TAKE 1 TABLET BY MOUTH EVERY MORNING AND 2 TABLETS BY MOUTH EVERY EVENING FOR ANXIETY  -     citalopram (CeleXA) 20 MG tablet; Take 1 tablet by mouth Every Night.  -     QUEtiapine (SEROquel) 50 MG tablet; One or two at hs.        Return in about 6 months (around 12/15/2020).           Patient knows to call if symptoms worsen or fail to improve between appointments.     I spent a total of 30 minutes in direct patient care, greater than 20 minutes (greater than 50%) were with the patient for assessment, coordination care, and counseling  regarding her status and answering any questions the patient had about the medication and plan..      Dictated utilizing [employer] dictation    SATURNINO Ibarra MD

## 2020-06-17 ENCOUNTER — HOSPITAL ENCOUNTER (OUTPATIENT)
Dept: CARDIOLOGY | Facility: HOSPITAL | Age: 65
Discharge: HOME | End: 2020-06-17
Admitting: INTERNAL MEDICINE

## 2020-06-17 VITALS — HEIGHT: 63 IN | WEIGHT: 170 LBS | BODY MASS INDEX: 30.12 KG/M2

## 2020-06-17 PROCEDURE — 78452 HT MUSCLE IMAGE SPECT MULT: CPT

## 2020-06-17 PROCEDURE — 93018 CV STRESS TEST I&R ONLY: CPT | Performed by: INTERNAL MEDICINE

## 2020-06-17 PROCEDURE — 25010000002 REGADENOSON 0.4 MG/5ML SOLUTION: Performed by: INTERNAL MEDICINE

## 2020-06-17 PROCEDURE — 0 TECHNETIUM SESTAMIBI: Performed by: NURSE PRACTITIONER

## 2020-06-17 PROCEDURE — A9500 TC99M SESTAMIBI: HCPCS | Performed by: NURSE PRACTITIONER

## 2020-06-17 PROCEDURE — 93017 CV STRESS TEST TRACING ONLY: CPT

## 2020-06-17 PROCEDURE — 78452 HT MUSCLE IMAGE SPECT MULT: CPT | Performed by: INTERNAL MEDICINE

## 2020-06-17 RX ADMIN — TECHNETIUM TC 99M SESTAMIBI 1 DOSE: 1 INJECTION INTRAVENOUS at 10:32

## 2020-06-17 RX ADMIN — REGADENOSON 0.4 MG: 0.08 INJECTION, SOLUTION INTRAVENOUS at 10:33

## 2020-06-17 RX ADMIN — TECHNETIUM TC 99M SESTAMIBI 1 DOSE: 1 INJECTION INTRAVENOUS at 08:10

## 2020-06-17 NOTE — PROGRESS NOTES
[facility] at [facility]  Office Visit Note    DATE: 06/19/2020    IDENTIFICATION: Elsa Porras is a  65 y.o. female who resides in [place], [place].    REASON FOR VISIT:  • Shortness of breath  • Palpitations            Elsa Porras presents to my office for follow-up of her shortness of breath, palpitations, history of chest pain, and cardiac risk factors.  The patient states that she remains fatigued and short of breath with modest activities.  She has swelling of her hands and face.  Dr. Harris prescribed her metolazone in addition to her daily Bumex.  This resulted in about 7 pounds of water loss but resulted in hypokalemia as well as renal insufficiency.  She is stopped taking the metolazone.    The patient called the office in February with complaints of chest tightness and shortness of breath.  A nuclear stress test was performed several days ago which showed normal perfusion and normal LVEF.  Echocardiogram performed in the office today was also within normal limits    Review of Systems   Constitution: Negative for malaise/fatigue.   Eyes: Negative for vision loss in left eye and vision loss in right eye.   Cardiovascular: Positive for dyspnea on exertion and palpitations. Negative for chest pain, near-syncope, orthopnea, paroxysmal nocturnal dyspnea and syncope.   Musculoskeletal: Negative for myalgias.   Neurological: Negative for brief paralysis, excessive daytime sleepiness, focal weakness, numbness, paresthesias and weakness.   All other systems reviewed and are negative.      The patient's past medical, social, family history and ROS reviewed in the patient's electronic medical record.    Allergies   Allergen Reactions   • Codeine      gastrointestinal upset.      • Isosorbide Nitrate Headache   • Levaquin [Levofloxacin] Hives   • Lorcet [Hydrocodone-Acetaminophen]       rash, nausea, vomiting.    • Other      DARVOCET, rash, nausea, vomiting,    • Percocet  [Oxycodone-Acetaminophen]      rash, nausea and vomiting.   • Ultram [Tramadol]       rash, nausea, vomiting,          Current Outpatient Medications:   •  allopurinol (ZYLOPRIM) 300 MG tablet, Daily., Disp: , Rfl: 2  •  ALPRAZolam XR (XANAX XR) 0.5 MG 24 hr tablet, TAKE 1 TABLET BY MOUTH EVERY MORNING AND 2 TABLETS BY MOUTH EVERY EVENING FOR ANXIETY (Patient taking differently: TAKE 1 TABLET BY MOUTH EVERY MORNING AND  1-2 TABLETS BY MOUTH EVERY EVENING FOR ANXIETY), Disp: 90 tablet, Rfl: 5  •  Amoxicillin-Pot Clavulanate (AUGMENTIN PO), Take  by mouth Daily., Disp: , Rfl:   •  bumetanide (BUMEX) 1 MG tablet, Take 1 tablet by mouth Daily., Disp: 90 tablet, Rfl: 3  •  Cholecalciferol (VITAMIN D PO), Take 50,000 Units by mouth Every 30 (Thirty) Days., Disp: , Rfl:   •  citalopram (CeleXA) 20 MG tablet, Take 1 tablet by mouth Every Night., Disp: 90 tablet, Rfl: 1  •  famotidine (PEPCID) 20 MG tablet, , Disp: , Rfl:   •  ibuprofen (ADVIL,MOTRIN) 800 MG tablet, TAKE 1 TABLET BY MOUTH THREE TIMES A DAY WITH FOOD AS DIRECTED BY YOUR PRESCRIBER AS NEEDED FOR PAIN OR INFLAMMATION, Disp: , Rfl: 0  •  levothyroxine (SYNTHROID, LEVOTHROID) 50 MCG tablet, Take 50 mcg by mouth Daily., Disp: , Rfl:   •  montelukast (SINGULAIR) 10 MG tablet, TAKE 1 TABLET BY MOUTH ONCE A DAY AT BEDTIME AS DIRECTED BY YOUR PRESCRIBER TO PREVENT ASTHMA ATTACKS OR ALLERGIES., Disp: , Rfl: 0  •  nitroglycerin (NITROSTAT) 0.4 MG SL tablet, , Disp: , Rfl:   •  polyethylene glycol (MIRALAX) powder, PLACE 17 GRAMS IN JUICE OR WATER THEN STIR AND DRINK ONCE A DAY FOR CONSTIPATION, Disp: , Rfl: 2  •  QUEtiapine (SEROquel) 50 MG tablet, One or two at hs., Disp: 180 tablet, Rfl: 1  •  simvastatin (ZOCOR) 40 MG tablet, Every Night., Disp: , Rfl: 2  •  spironolactone (ALDACTONE) 25 MG tablet, Take 1 tablet by mouth Daily., Disp: 90 tablet, Rfl: 1    Past Medical History:   Diagnosis Date   • Anxiety and depression 12/7/2016   • Arthritis    • Asthma    • Bell's  "palsy    • Cancer (CMS/HCC)     skin   • Chest pain    • Esophageal stricture    • Fibromyalgia 2016   • GERD (gastroesophageal reflux disease) 2016   • Hiatal hernia    • Hyperlipidemia 2016   • Hypertension 2016   • Hypothyroidism 2016   • Inguinal hernia     left   • Migraines    • Mitral valve disease 2016   • Obesity 2016   • Panic disorder 2016   • PONV (postoperative nausea and vomiting)    • Reactive airway disease 2016       Past Surgical History:   Procedure Laterality Date   • ABDOMINAL SURGERY     • BREAST BIOPSY Right 2004    benign   • BREAST SURGERY      Right breast lumpectomy.    • CARDIAC CATHETERIZATION     •  SECTION     • CHOLECYSTECTOMY     • COLONOSCOPY     • COLONOSCOPY N/A 2016    Procedure: COLONOSCOPY;  Surgeon: Adrian Cruz MD;  [facility];  Service:    • HERNIA REPAIR      multiple   • HYSTERECTOMY     • SKIN CANCER EXCISION         Family History   Problem Relation Age of Onset   • Hypertension Mother    • Cancer Father    • Hypertension Maternal Grandmother    • Heart disease Maternal Grandmother    • Hypertension Maternal Grandfather    • Heart disease Maternal Grandfather    • Breast cancer Maternal Aunt        Social History     Tobacco Use   • Smoking status: Never Smoker   • Smokeless tobacco: Never Used   Substance Use Topics   • Alcohol use: No           Blood pressure 108/66, pulse 73, temperature 97.7 °F (36.5 °C), height 160 cm (63\"), weight 78 kg (172 lb), SpO2 97 %.  Body mass index is 30.47 kg/m².  Vitals:    20 0949   Patient Position: Sitting       Physical Exam   Constitutional: She is oriented to person, place, and time. She appears well-developed and well-nourished.   HENT:   Head: Normocephalic and atraumatic.   Eyes: Pupils are equal, round, and reactive to light. No scleral icterus.   Neck: No JVD present. Carotid bruit is not present. No thyromegaly present.   Cardiovascular: Normal rate, " regular rhythm, S1 normal and S2 normal. Exam reveals no gallop.   No murmur heard.  Pulmonary/Chest: Effort normal and breath sounds normal.   Abdominal: Soft. She exhibits no mass. There is no hepatosplenomegaly. There is no tenderness.   Neurological: She is alert and oriented to person, place, and time.   Skin: Skin is warm and dry. No cyanosis. Nails show no clubbing.   Psychiatric: She has a normal mood and affect. Her behavior is normal.       Data Review (reviewed with patient):     Procedures           Problem List Items Addressed This Visit        Cardiology Problems    Palpitations    Overview     · Echo (6/19/2020): LVEF 70%.  No significant valvular abnormality.  No mitral valve prolapse.         Essential hypertension    Overview     • Target blood pressure <130/80 mmHg         Current Assessment & Plan     · Controlled  · Continue present medical therapy         Relevant Medications    bumetanide (BUMEX) 1 MG tablet    spironolactone (ALDACTONE) 25 MG tablet       Other    Chest pain syndrome    Overview     · Remote cardiac catheterization revealing normal coronary arteries, data deficit   · Nuclear stress test (04/10/2012): No ischemia/infarct  · Recurrent chest pain at [facility] ER presentation with negative enzymes and EKG, 7/29/2013  · Pharmacologic nuclear stress (03/18/2016): No ischemia.  · Pharmacologic nuclear stress (6/17/2020): Normal perfusion.  Normal LVEF.         Current Assessment & Plan     · Recent nuclear stress test shows no evidence of ischemia to account for the patient's symptoms         Dyspnea on exertion - Primary    Overview     · Echo (6/19/2020): LVEF 70%.  No significant valvular abnormality.  No mitral valve prolapse.  · Pharmacologic nuclear stress (6/17/2020): No ischemia/infarct.  Normal LVEF         Current Assessment & Plan     · Recent echo and nuclear stress testing are both normal  · Obtain proBNP and chest x-ray to assess for diastolic heart  failure  · I suspect patient noncardiac etiologies for the patient's symptoms         Relevant Medications    bumetanide (BUMEX) 1 MG tablet    spironolactone (ALDACTONE) 25 MG tablet    Other Relevant Orders    Basic Metabolic Panel    Basic Metabolic Panel    Adult Transthoracic Echo Complete W/ Cont if Necessary Per Protocol    proBNP    XR Chest 2 View (Completed)               · Discontinue metolazone  · Obtain BMP and proBNP today  · Chest x-ray  · Echocardiogram (already done)  · Consider noncardiac etiologies for the patient's symptoms  Return in about 1 year (around 6/19/2021).      JUDAH Ga MD [facility], [facility]  Interventional and General Cardiology      6/19/2020

## 2020-06-19 ENCOUNTER — HOSPITAL ENCOUNTER (OUTPATIENT)
Dept: GENERAL RADIOLOGY | Facility: HOSPITAL | Age: 65
Discharge: HOME | End: 2020-06-19

## 2020-06-19 ENCOUNTER — OFFICE VISIT (OUTPATIENT)
Dept: CARDIOLOGY | Facility: CLINIC | Age: 65
End: 2020-06-19

## 2020-06-19 ENCOUNTER — HOSPITAL ENCOUNTER (OUTPATIENT)
Dept: CARDIOLOGY | Facility: HOSPITAL | Age: 65
Discharge: HOME | End: 2020-06-19
Admitting: INTERNAL MEDICINE

## 2020-06-19 VITALS
WEIGHT: 172 LBS | TEMPERATURE: 97.7 F | OXYGEN SATURATION: 97 % | SYSTOLIC BLOOD PRESSURE: 108 MMHG | HEART RATE: 73 BPM | DIASTOLIC BLOOD PRESSURE: 66 MMHG | BODY MASS INDEX: 30.48 KG/M2 | HEIGHT: 63 IN

## 2020-06-19 DIAGNOSIS — R00.2 PALPITATIONS: ICD-10-CM

## 2020-06-19 DIAGNOSIS — I50.30 HEART FAILURE WITH PRESERVED LEFT VENTRICULAR FUNCTION (HFPEF) (HCC): ICD-10-CM

## 2020-06-19 DIAGNOSIS — R06.09 DYSPNEA ON EXERTION: Primary | ICD-10-CM

## 2020-06-19 DIAGNOSIS — R07.9 CHEST PAIN SYNDROME: ICD-10-CM

## 2020-06-19 DIAGNOSIS — I10 ESSENTIAL HYPERTENSION: ICD-10-CM

## 2020-06-19 PROBLEM — R55 PRE-SYNCOPE: Status: RESOLVED | Noted: 2017-04-12 | Resolved: 2020-06-19

## 2020-06-19 PROBLEM — Z01.810 PREOPERATIVE CARDIOVASCULAR EXAMINATION: Status: ACTIVE | Noted: 2020-06-19

## 2020-06-19 LAB
BH CV NUCLEAR PRIOR STUDY: 1
BH CV STRESS BP STAGE 1: NORMAL
BH CV STRESS BP STAGE 2: NORMAL
BH CV STRESS COMMENTS STAGE 1: NORMAL
BH CV STRESS COMMENTS STAGE 2: NORMAL
BH CV STRESS DOSE REGADENOSON STAGE 1: 0.4
BH CV STRESS DURATION MIN STAGE 1: 4
BH CV STRESS DURATION MIN STAGE 2: 2
BH CV STRESS DURATION SEC STAGE 1: 0
BH CV STRESS DURATION SEC STAGE 2: 0
BH CV STRESS HR STAGE 1: 85
BH CV STRESS HR STAGE 2: 77
BH CV STRESS PROTOCOL 1: NORMAL
BH CV STRESS RECOVERY BP: NORMAL MMHG
BH CV STRESS RECOVERY HR: 78 BPM
BH CV STRESS STAGE 1: 1
BH CV STRESS STAGE 2: 2
LV EF NUC BP: 71 %
MAXIMAL PREDICTED HEART RATE: 156 BPM
PERCENT MAX PREDICTED HR: 64.1 %
STRESS BASELINE BP: NORMAL MMHG
STRESS BASELINE HR: 64 BPM
STRESS PERCENT HR: 75 %
STRESS POST PEAK BP: NORMAL MMHG
STRESS POST PEAK HR: 100 BPM
STRESS TARGET HR: 133 BPM

## 2020-06-19 PROCEDURE — 99214 OFFICE O/P EST MOD 30 MIN: CPT | Performed by: INTERNAL MEDICINE

## 2020-06-19 PROCEDURE — 71046 X-RAY EXAM CHEST 2 VIEWS: CPT

## 2020-06-19 PROCEDURE — 93306 TTE W/DOPPLER COMPLETE: CPT

## 2020-06-19 PROCEDURE — 93306 TTE W/DOPPLER COMPLETE: CPT | Performed by: INTERNAL MEDICINE

## 2020-06-19 RX ORDER — SPIRONOLACTONE 25 MG/1
25 TABLET ORAL DAILY
Qty: 90 TABLET | Refills: 1 | Status: SHIPPED | OUTPATIENT
Start: 2020-06-19 | End: 2021-12-03

## 2020-06-19 RX ORDER — BUMETANIDE 1 MG/1
1 TABLET ORAL DAILY
Qty: 90 TABLET | Refills: 3 | Status: SHIPPED | OUTPATIENT
Start: 2020-06-19 | End: 2021-03-24 | Stop reason: SDUPTHER

## 2020-06-19 NOTE — ASSESSMENT & PLAN NOTE
· Recent echo and nuclear stress testing are both normal  · Obtain proBNP and chest x-ray to assess for diastolic heart failure  · I suspect patient noncardiac etiologies for the patient's symptoms

## 2020-06-20 LAB
BUN SERPL-MCNC: 15 MG/DL (ref 8–23)
BUN/CREAT SERPL: 17.6 (ref 7–25)
CALCIUM SERPL-MCNC: 9.6 MG/DL (ref 8.6–10.5)
CHLORIDE SERPL-SCNC: 104 MMOL/L (ref 98–107)
CO2 SERPL-SCNC: 26.6 MMOL/L (ref 22–29)
CREAT SERPL-MCNC: 0.85 MG/DL (ref 0.57–1)
GLUCOSE SERPL-MCNC: 121 MG/DL (ref 65–99)
NT-PROBNP SERPL-MCNC: 103 PG/ML (ref 0–287)
POTASSIUM SERPL-SCNC: 4 MMOL/L (ref 3.5–5.2)
SODIUM SERPL-SCNC: 142 MMOL/L (ref 136–145)

## 2020-06-22 ENCOUNTER — TELEPHONE (OUTPATIENT)
Dept: CARDIOLOGY | Facility: CLINIC | Age: 65
End: 2020-06-22

## 2020-06-22 NOTE — TELEPHONE ENCOUNTER
Patient called again to review all testing. Reassured her that it was normal. She verbalized understanding.

## 2020-06-22 NOTE — TELEPHONE ENCOUNTER
Patient called to report that she was using the bathroom this AM and her  had to help her off the toilet because she couldn't move her legs. She also reported some trembling and numbness in her arm over the weekend. I reviewed her CXR, labs, stress and echo results, which were normal. Advised her to see PCP for non-cardiac etiologies. She verbalized understanding.

## 2020-06-23 LAB
BH CV ECHO MEAS - AO MAX PG (FULL): 5.1 MMHG
BH CV ECHO MEAS - AO MAX PG: 9 MMHG
BH CV ECHO MEAS - AO MEAN PG (FULL): 2 MMHG
BH CV ECHO MEAS - AO MEAN PG: 4 MMHG
BH CV ECHO MEAS - AO ROOT AREA (BSA CORRECTED): 1.6
BH CV ECHO MEAS - AO ROOT AREA: 6.6 CM^2
BH CV ECHO MEAS - AO ROOT DIAM: 2.9 CM
BH CV ECHO MEAS - AO V2 MAX: 151 CM/SEC
BH CV ECHO MEAS - AO V2 MEAN: 92.8 CM/SEC
BH CV ECHO MEAS - AO V2 VTI: 31.7 CM
BH CV ECHO MEAS - AVA(I,A): 2.2 CM^2
BH CV ECHO MEAS - AVA(I,D): 2.2 CM^2
BH CV ECHO MEAS - AVA(V,A): 2.1 CM^2
BH CV ECHO MEAS - AVA(V,D): 2.1 CM^2
BH CV ECHO MEAS - BSA(HAYCOCK): 1.9 M^2
BH CV ECHO MEAS - BSA: 1.8 M^2
BH CV ECHO MEAS - BZI_BMI: 30.5 KILOGRAMS/M^2
BH CV ECHO MEAS - BZI_METRIC_HEIGHT: 160 CM
BH CV ECHO MEAS - BZI_METRIC_WEIGHT: 78 KG
BH CV ECHO MEAS - EDV(CUBED): 39.7 ML
BH CV ECHO MEAS - EDV(MOD-SP2): 40 ML
BH CV ECHO MEAS - EDV(MOD-SP4): 62 ML
BH CV ECHO MEAS - EDV(TEICH): 47.8 ML
BH CV ECHO MEAS - EF(CUBED): 80.7 %
BH CV ECHO MEAS - EF(MOD-BP): 60 %
BH CV ECHO MEAS - EF(MOD-SP2): 67.5 %
BH CV ECHO MEAS - EF(MOD-SP4): 71 %
BH CV ECHO MEAS - EF(TEICH): 74.4 %
BH CV ECHO MEAS - EF{MOD-BP}: 69 %
BH CV ECHO MEAS - ESV(CUBED): 7.6 ML
BH CV ECHO MEAS - ESV(MOD-SP2): 13 ML
BH CV ECHO MEAS - ESV(MOD-SP4): 18 ML
BH CV ECHO MEAS - ESV(TEICH): 12.2 ML
BH CV ECHO MEAS - FS: 42.2 %
BH CV ECHO MEAS - IVS/LVPW: 0.97
BH CV ECHO MEAS - IVSD: 0.77 CM
BH CV ECHO MEAS - LA DIMENSION: 3.1 CM
BH CV ECHO MEAS - LA/AO: 1.1
BH CV ECHO MEAS - LAD MAJOR: 5 CM
BH CV ECHO MEAS - LAT PEAK E' VEL: 12.2 CM/SEC
BH CV ECHO MEAS - LATERAL E/E' RATIO: 8.1
BH CV ECHO MEAS - LV DIASTOLIC VOL/BSA (35-75): 34.2 ML/M^2
BH CV ECHO MEAS - LV MASS(C)D: 69.6 GRAMS
BH CV ECHO MEAS - LV MASS(C)DI: 38.4 GRAMS/M^2
BH CV ECHO MEAS - LV MAX PG: 3.9 MMHG
BH CV ECHO MEAS - LV MEAN PG: 2 MMHG
BH CV ECHO MEAS - LV SYSTOLIC VOL/BSA (12-30): 9.9 ML/M^2
BH CV ECHO MEAS - LV V1 MAX: 99.3 CM/SEC
BH CV ECHO MEAS - LV V1 MEAN: 69.4 CM/SEC
BH CV ECHO MEAS - LV V1 VTI: 22.7 CM
BH CV ECHO MEAS - LVIDD: 3.4 CM
BH CV ECHO MEAS - LVIDS: 2 CM
BH CV ECHO MEAS - LVLD AP2: 6.5 CM
BH CV ECHO MEAS - LVLD AP4: 6.8 CM
BH CV ECHO MEAS - LVLS AP2: 4.8 CM
BH CV ECHO MEAS - LVLS AP4: 5.2 CM
BH CV ECHO MEAS - LVOT AREA (M): 3.1 CM^2
BH CV ECHO MEAS - LVOT AREA: 3.1 CM^2
BH CV ECHO MEAS - LVOT DIAM: 2 CM
BH CV ECHO MEAS - LVPWD: 0.79 CM
BH CV ECHO MEAS - MED PEAK E' VEL: 7.9 CM/SEC
BH CV ECHO MEAS - MEDIAL E/E' RATIO: 12.6
BH CV ECHO MEAS - MV A MAX VEL: 83.9 CM/SEC
BH CV ECHO MEAS - MV DEC TIME: 0.25 SEC
BH CV ECHO MEAS - MV E MAX VEL: 99.2 CM/SEC
BH CV ECHO MEAS - MV E/A: 1.2
BH CV ECHO MEAS - PA ACC SLOPE: 978 CM/SEC^2
BH CV ECHO MEAS - PA ACC TIME: 0.11 SEC
BH CV ECHO MEAS - PA PR(ACCEL): 27.7 MMHG
BH CV ECHO MEAS - RAP SYSTOLE: 3 MMHG
BH CV ECHO MEAS - RVSP: 22.9 MMHG
BH CV ECHO MEAS - SI(AO): 115.4 ML/M^2
BH CV ECHO MEAS - SI(CUBED): 17.6 ML/M^2
BH CV ECHO MEAS - SI(LVOT): 39.3 ML/M^2
BH CV ECHO MEAS - SI(MOD-SP2): 14.9 ML/M^2
BH CV ECHO MEAS - SI(MOD-SP4): 24.3 ML/M^2
BH CV ECHO MEAS - SI(TEICH): 19.6 ML/M^2
BH CV ECHO MEAS - SV(AO): 209.4 ML
BH CV ECHO MEAS - SV(CUBED): 32 ML
BH CV ECHO MEAS - SV(LVOT): 71.3 ML
BH CV ECHO MEAS - SV(MOD-SP2): 27 ML
BH CV ECHO MEAS - SV(MOD-SP4): 44 ML
BH CV ECHO MEAS - SV(TEICH): 35.5 ML
BH CV ECHO MEAS - TAPSE (>1.6): 2.8 CM2
BH CV ECHO MEAS - TR MAX PG: 19.9 MMHG
BH CV ECHO MEAS - TR MAX VEL: 223 CM/SEC
BH CV ECHO MEASUREMENTS AVERAGE E/E' RATIO: 9.87
BH CV VAS BP LEFT ARM: NORMAL MMHG
BH CV XLRA - RV BASE: 2.9 CM
BH CV XLRA - RV LENGTH: 4.9 CM
BH CV XLRA - RV MID: 2.6 CM
LEFT ATRIUM VOLUME INDEX: 18.7 ML/M^2
LEFT ATRIUM VOLUME: 34 ML
LV EF 2D ECHO EST: 65 %

## 2020-10-12 ENCOUNTER — TRANSCRIBE ORDERS (OUTPATIENT)
Dept: LAB | Facility: HOSPITAL | Age: 65
End: 2020-10-12

## 2020-10-12 ENCOUNTER — HOSPITAL ENCOUNTER (OUTPATIENT)
Dept: GENERAL RADIOLOGY | Facility: HOSPITAL | Age: 65
Discharge: HOME | End: 2020-10-12
Admitting: NURSE PRACTITIONER

## 2020-10-12 DIAGNOSIS — R06.02 SOB (SHORTNESS OF BREATH): ICD-10-CM

## 2020-10-12 DIAGNOSIS — R05.9 COUGH: Primary | ICD-10-CM

## 2020-10-12 DIAGNOSIS — R05.9 COUGH: ICD-10-CM

## 2020-10-12 PROCEDURE — 71046 X-RAY EXAM CHEST 2 VIEWS: CPT

## 2020-10-12 PROCEDURE — 71046 X-RAY EXAM CHEST 2 VIEWS: CPT | Performed by: RADIOLOGY

## 2020-11-10 ENCOUNTER — HOSPITAL ENCOUNTER (OUTPATIENT)
Dept: ULTRASOUND IMAGING | Facility: HOSPITAL | Age: 65
Discharge: HOME | End: 2020-11-10
Admitting: FAMILY MEDICINE

## 2020-11-10 DIAGNOSIS — R10.11 ABDOMINAL PAIN, RIGHT UPPER QUADRANT: ICD-10-CM

## 2020-11-10 PROCEDURE — 76705 ECHO EXAM OF ABDOMEN: CPT | Performed by: RADIOLOGY

## 2020-11-10 PROCEDURE — 76705 ECHO EXAM OF ABDOMEN: CPT

## 2020-11-20 ENCOUNTER — HOSPITAL ENCOUNTER (OUTPATIENT)
Dept: MAMMOGRAPHY | Facility: HOSPITAL | Age: 65
Discharge: HOME | End: 2020-11-20
Admitting: FAMILY MEDICINE

## 2020-11-20 DIAGNOSIS — Z12.31 VISIT FOR SCREENING MAMMOGRAM: ICD-10-CM

## 2020-11-20 PROCEDURE — 77063 BREAST TOMOSYNTHESIS BI: CPT

## 2020-11-20 PROCEDURE — 77067 SCR MAMMO BI INCL CAD: CPT

## 2020-11-20 PROCEDURE — 77063 BREAST TOMOSYNTHESIS BI: CPT | Performed by: RADIOLOGY

## 2020-11-20 PROCEDURE — 77067 SCR MAMMO BI INCL CAD: CPT | Performed by: RADIOLOGY

## 2020-12-15 ENCOUNTER — OFFICE VISIT (OUTPATIENT)
Dept: PSYCHIATRY | Facility: CLINIC | Age: 65
End: 2020-12-15

## 2020-12-15 VITALS
WEIGHT: 173.2 LBS | BODY MASS INDEX: 30.69 KG/M2 | DIASTOLIC BLOOD PRESSURE: 69 MMHG | SYSTOLIC BLOOD PRESSURE: 136 MMHG | TEMPERATURE: 98 F | HEART RATE: 75 BPM | HEIGHT: 63 IN

## 2020-12-15 DIAGNOSIS — Z79.899 MEDICATION MANAGEMENT: Primary | ICD-10-CM

## 2020-12-15 DIAGNOSIS — F34.1 DYSTHYMIC DISORDER: ICD-10-CM

## 2020-12-15 LAB
AMPHETAMINE CUT-OFF: ABNORMAL
BENZODIAZIPINE CUT-OFF: ABNORMAL
BUPRENORPHINE CUT-OFF: ABNORMAL
COCAINE CUT-OFF: ABNORMAL
EXTERNAL AMPHETAMINE SCREEN URINE: NEGATIVE
EXTERNAL BENZODIAZEPINE SCREEN URINE: POSITIVE
EXTERNAL BUPRENORPHINE SCREEN URINE: NEGATIVE
EXTERNAL COCAINE SCREEN URINE: NEGATIVE
EXTERNAL MDMA: NEGATIVE
EXTERNAL METHADONE SCREEN URINE: NEGATIVE
EXTERNAL METHAMPHETAMINE SCREEN URINE: NEGATIVE
EXTERNAL OPIATES SCREEN URINE: NEGATIVE
EXTERNAL OXYCODONE SCREEN URINE: NEGATIVE
EXTERNAL THC SCREEN URINE: NEGATIVE
MDMA CUT-OFF: ABNORMAL
METHADONE CUT-OFF: ABNORMAL
METHAMPHETAMINE CUT-OFF: ABNORMAL
OPIATES CUT-OFF: ABNORMAL
OXYCODONE CUT-OFF: ABNORMAL
THC CUT-OFF: ABNORMAL

## 2020-12-15 PROCEDURE — 99214 OFFICE O/P EST MOD 30 MIN: CPT | Performed by: PSYCHIATRY & NEUROLOGY

## 2020-12-15 RX ORDER — QUETIAPINE FUMARATE 50 MG/1
TABLET, FILM COATED ORAL
Qty: 180 TABLET | Refills: 1 | Status: SHIPPED | OUTPATIENT
Start: 2020-12-15 | End: 2021-08-23

## 2020-12-15 RX ORDER — ALPRAZOLAM 0.5 MG/1
TABLET, EXTENDED RELEASE ORAL
Qty: 90 TABLET | Refills: 5 | Status: SHIPPED | OUTPATIENT
Start: 2020-12-15 | End: 2021-06-10 | Stop reason: SDUPTHER

## 2020-12-15 RX ORDER — LOSARTAN POTASSIUM 50 MG/1
TABLET ORAL
COMMUNITY
Start: 2020-12-07 | End: 2020-12-22

## 2020-12-15 RX ORDER — CITALOPRAM 20 MG/1
20 TABLET ORAL NIGHTLY
Qty: 90 TABLET | Refills: 1 | Status: SHIPPED | OUTPATIENT
Start: 2020-12-15 | End: 2021-09-30 | Stop reason: SDUPTHER

## 2020-12-15 NOTE — PROGRESS NOTES
"Patient ID: Elsa Porras is a 65 y.o. female    SERVICE TYPE: EVALUATION AND MANAGEMENT (greater than 50% of the time spent for supportive psychotherapy).      /69   Pulse 75   Temp 98 °F (36.7 °C)   Ht 160 cm (62.99\")   Wt 78.6 kg (173 lb 3.2 oz)   BMI 30.69 kg/m²     ALLERGIES:  Codeine, Isosorbide nitrate, Levaquin [levofloxacin], Lorcet [hydrocodone-acetaminophen], Other, Percocet [oxycodone-acetaminophen], and Ultram [tramadol]    CC/ Focus of the visit: anxiety/ depression.      HPI: patient reports she \"have my issues but doing OK\". No recurrence of significant depressive symptoms, activities, interest, sleep all at their relative norms. Enjoying their great grandson \"the nupur of our lives\".     PFSH: home life stable.     Review of Systems   Respiratory: Negative.    Cardiovascular: Negative.    Neurological: Negative.        SUPPORTIVE PSYCHOTHERAPY: continuing efforts to promote the therapeutic alliance, address the patient’s issues, and strengthen self awareness, insights, and positive coping skills such as Exercising, listen to music, spending time in nature and utilizing resources.     Mental Status Exam  Appearance:  appropriate  Attitude toward clinician:  cooperative and agreeable   Speech:    Rate:  regular rate and rhythm   Volume: normal  Motor:  no abnormal movements   Mood:  Good  Affect:  euthymic  Thought Processes:  linear, logical, and goal directed  Thought Content:  Normal   Feeling Hopeless: absent  Suicidal Thoughts or Intent:  absent  Homicidal Thoughts:  absent  Perceptual Disturbance: no perceptual disturbance  Attention and Concentration:  good  Insight and Judgement:  good  Memory:  memory appears to be intact    LABS:   Recent Results (from the past 168 hour(s))   [id number] Drug Screen    Collection Time: 12/15/20  1:01 PM    Specimen: Urine, Clean Catch   Result Value Ref Range    External Amphetamine Screen Urine Negative     Amphetamine Cut-Off 1000ng/ml     External " Benzodiazepine Screen Urine Positive     Benzodiazipine Cut-Off 300ng/ml     External Cocaine Screen Urine Negative     Cocaine Cut-Off 300ng/ml     External THC Screen Urine Negative     THC Cut-Off 50ng/ml     External Methadone Screen Urine Negative     Methadone Cut-Off 300ng/ml     External Methamphetamine Screen Urine Negative     Methamphetamine Cut-Off 1000ng/ml     External Oxycodone Screen Urine Negative     Oxycodone Cut-Off 100ng/ml     External Buprenorphine Screen Urine Negative     Buprenorphine Cut-Off 10ng/ml     External MDMA Negative     MDMA Cut-Off 500ng/ml     External Opiates Screen Urine Negative     Opiates Cut-Off 300ng/ml        MEDICATION ISSUES: Have discussed with the patient the medications Risks, Benefits, and Side effects including potential falls, possible impaired driving and  metabolic adversities among others. No medication side effects or related complaints today.     TREATMENT PLAN/GOALS: Continue supportive psychotherapy efforts and medications as indicated.     Current Outpatient Medications   Medication Sig Dispense Refill   • allopurinol (ZYLOPRIM) 300 MG tablet Daily.  2   • ALPRAZolam XR (XANAX XR) 0.5 MG 24 hr tablet TAKE 1 TABLET BY MOUTH EVERY MORNING AND 2 TABLETS BY MOUTH EVERY EVENING FOR ANXIETY 90 tablet 5   • bumetanide (BUMEX) 1 MG tablet Take 1 tablet by mouth Daily. 90 tablet 3   • Cholecalciferol (VITAMIN D PO) Take 50,000 Units by mouth Every 30 (Thirty) Days.     • citalopram (CeleXA) 20 MG tablet Take 1 tablet by mouth Every Night. 90 tablet 1   • famotidine (PEPCID) 20 MG tablet      • levothyroxine (SYNTHROID, LEVOTHROID) 50 MCG tablet Take 50 mcg by mouth Daily.     • losartan (COZAAR) 50 MG tablet      • montelukast (SINGULAIR) 10 MG tablet TAKE 1 TABLET BY MOUTH ONCE A DAY AT BEDTIME AS DIRECTED BY YOUR PRESCRIBER TO PREVENT ASTHMA ATTACKS OR ALLERGIES.  0   • nitroglycerin (NITROSTAT) 0.4 MG SL tablet      • polyethylene glycol (MIRALAX) powder PLACE  17 GRAMS IN JUICE OR WATER THEN STIR AND DRINK ONCE A DAY FOR CONSTIPATION  2   • QUEtiapine (SEROquel) 50 MG tablet One or two at hs. 180 tablet 1   • simvastatin (ZOCOR) 40 MG tablet Every Night.  2   • spironolactone (ALDACTONE) 25 MG tablet Take 1 tablet by mouth Daily. 90 tablet 1   • Amoxicillin-Pot Clavulanate (AUGMENTIN PO) Take  by mouth Daily.     • ibuprofen (ADVIL,MOTRIN) 800 MG tablet TAKE 1 TABLET BY MOUTH THREE TIMES A DAY WITH FOOD AS DIRECTED BY YOUR PRESCRIBER AS NEEDED FOR PAIN OR INFLAMMATION  0     No current facility-administered medications for this visit.        COLLATERAL PSYCHOTHERAPEUTIC INTERVENTION:  patient not interested in additional psychotherapy.    RISK:  moderate    Assessment/Plan     Diagnoses and all orders for this visit:    1. Medication management (Primary)  -     [facility] Drug Screen    2. Dysthymic disorder  -     ALPRAZolam XR (XANAX XR) 0.5 MG 24 hr tablet; TAKE 1 TABLET BY MOUTH EVERY MORNING AND 2 TABLETS BY MOUTH EVERY EVENING FOR ANXIETY  Dispense: 90 tablet; Refill: 5  -     citalopram (CeleXA) 20 MG tablet; Take 1 tablet by mouth Every Night.  Dispense: 90 tablet; Refill: 1  -     QUEtiapine (SEROquel) 50 MG tablet; One or two at hs.  Dispense: 180 tablet; Refill: 1        Return in about 6 months (around 6/15/2021).           Patient knows to call if symptoms worsen or fail to improve between appointments.     I spent a total of 25 minutes in direct patient care, greater than 17 minutes (greater than 50%) were with the patient for assessment, coordination care, and counseling  regarding her status and answering any questions the patient had about the medication and plan..      Dictated utilizing Dragon dictation    SATURNINO Ibarra MD

## 2020-12-21 NOTE — PROGRESS NOTES
[facility]  Office Visit Note    DATE: 12/22/2020    IDENTIFICATION: Elsa Porras is a 65 y.o. female who is  and resides in [place], [place]    REASON FOR VISIT:  • Shortness of breath  • Palpitation            Elsa Porras had telephone visit today at her request due to worsening palpitations and pre syncope.  The patient reports for the past month she has been having worsening palpitations associated with weakness.  She is almost passed out but her  caught her.  She did not lose consciousness.  She is unable to take showers for feeling weak and has to sit to take a bath.  Once she gets out of the bathtub she has to lie down in her bed.  She is seen her primary care provider who has thrown around diagnosis such as Ménière's.  They have also mention possible sleep apnea.  She denies any chest pain/pressure/tightness, dyspnea, or orthopnea.  The patient had a stress test and echocardiogram in June of this year both of which were benign.  She was taking losartan but could not tolerate it because it caused her to cough.  Blood pressure today was 161/79.  I had her  perform orthostatics while on the phone.  Lying the patient was 147/65 and heart rate 78.  Sitting 147/74 with heart rate of 72 and standing 148/72 with heart rate of 83.    Review of Systems   Constitution: Negative for malaise/fatigue.   Eyes: Negative for vision loss in left eye and vision loss in right eye.   Cardiovascular: Positive for near-syncope. Negative for chest pain, dyspnea on exertion, orthopnea, palpitations, paroxysmal nocturnal dyspnea and syncope.   Musculoskeletal: Negative for myalgias.   Neurological: Positive for light-headedness and weakness. Negative for brief paralysis, excessive daytime sleepiness, focal weakness, numbness and paresthesias.   All other systems reviewed and are negative.      The patient's past medical, social, family history and ROS reviewed in the  patient's electronic medical record.    Allergies   Allergen Reactions   • Codeine      gastrointestinal upset.      • Isosorbide Nitrate Headache   • Levaquin [Levofloxacin] Hives   • Lorcet [Hydrocodone-Acetaminophen]       rash, nausea, vomiting.    • Other      DARVOCET, rash, nausea, vomiting,    • Percocet [Oxycodone-Acetaminophen]      rash, nausea and vomiting.   • Ultram [Tramadol]       rash, nausea, vomiting,          Current Outpatient Medications:   •  allopurinol (ZYLOPRIM) 300 MG tablet, 300 mg Daily., Disp: , Rfl: 2  •  ALPRAZolam XR (XANAX XR) 0.5 MG 24 hr tablet, TAKE 1 TABLET BY MOUTH EVERY MORNING AND 2 TABLETS BY MOUTH EVERY EVENING FOR ANXIETY, Disp: 90 tablet, Rfl: 5  •  aspirin 81 MG EC tablet, Take 81 mg by mouth Daily., Disp: , Rfl:   •  bumetanide (BUMEX) 1 MG tablet, Take 1 tablet by mouth Daily., Disp: 90 tablet, Rfl: 3  •  Cholecalciferol (VITAMIN D PO), Take 50,000 Units by mouth Every 30 (Thirty) Days., Disp: , Rfl:   •  citalopram (CeleXA) 20 MG tablet, Take 1 tablet by mouth Every Night., Disp: 90 tablet, Rfl: 1  •  famotidine (PEPCID) 20 MG tablet, 20 mg Daily., Disp: , Rfl:   •  ibuprofen (ADVIL,MOTRIN) 800 MG tablet, TAKE 1 TABLET BY MOUTH THREE TIMES A DAY WITH FOOD AS DIRECTED BY YOUR PRESCRIBER AS NEEDED FOR PAIN OR INFLAMMATION, Disp: , Rfl: 0  •  levothyroxine (SYNTHROID, LEVOTHROID) 50 MCG tablet, Take 50 mcg by mouth Daily., Disp: , Rfl:   •  montelukast (SINGULAIR) 10 MG tablet, TAKE 1 TABLET BY MOUTH ONCE A DAY AT BEDTIME AS DIRECTED BY YOUR PRESCRIBER TO PREVENT ASTHMA ATTACKS OR ALLERGIES., Disp: , Rfl: 0  •  nitroglycerin (NITROSTAT) 0.4 MG SL tablet, , Disp: , Rfl:   •  polyethylene glycol (MIRALAX) powder, PLACE 17 GRAMS IN JUICE OR WATER THEN STIR AND DRINK ONCE A DAY FOR CONSTIPATION, Disp: , Rfl: 2  •  QUEtiapine (SEROquel) 50 MG tablet, One or two at hs. (Patient taking differently: 100 mg Every Night. One or two at hs.), Disp: 180 tablet, Rfl: 1  •  simvastatin  "(ZOCOR) 40 MG tablet, Every Night., Disp: , Rfl: 2  •  spironolactone (ALDACTONE) 25 MG tablet, Take 1 tablet by mouth Daily., Disp: 90 tablet, Rfl: 1  •  Sulfamethoxazole-Trimethoprim (BACTRIM PO), Take  by mouth 2 (Two) Times a Day., Disp: , Rfl:     Past Medical History:   Diagnosis Date   • Anxiety and depression 2016   • Arthritis    • Asthma    • Bell's palsy    • Cancer (CMS/HCC)     skin   • Chest pain    • Esophageal stricture    • Fibromyalgia 2016   • GERD (gastroesophageal reflux disease) 2016   • Hiatal hernia    • Hyperlipidemia 2016   • Hypertension 2016   • Hypothyroidism 2016   • Inguinal hernia     left   • Migraines    • Mitral valve disease 2016   • Obesity 2016   • Panic disorder 2016   • PONV (postoperative nausea and vomiting)    • Reactive airway disease 2016       Past Surgical History:   Procedure Laterality Date   • ABDOMINAL SURGERY     • BREAST BIOPSY Right 2004    benign   • BREAST SURGERY      Right breast lumpectomy.    • CARDIAC CATHETERIZATION     •  SECTION     • CHOLECYSTECTOMY     • COLONOSCOPY     • COLONOSCOPY N/A 2016    Procedure: COLONOSCOPY;  Surgeon: Adrian Cruz MD;  Location: [facility];  Service:    • HERNIA REPAIR      multiple   • HYSTERECTOMY     • SKIN CANCER EXCISION         Family History   Problem Relation Age of Onset   • Hypertension Mother    • Cancer Father    • Hypertension Maternal Grandmother    • Heart disease Maternal Grandmother    • Hypertension Maternal Grandfather    • Heart disease Maternal Grandfather    • Breast cancer Maternal Aunt        Social History     Tobacco Use   • Smoking status: Never Smoker   • Smokeless tobacco: Never Used   Substance Use Topics   • Alcohol use: No           Blood pressure 161/79, pulse 97, height 160 cm (63\"), weight 76.2 kg (168 lb).  Body mass index is 29.76 kg/m².  Vitals:    20 1406   Patient Position: Sitting       Pulmonary:      Comments: " No audible dyspnea  Psychiatric:         Attention and Perception: Attention normal.         Mood and Affect: Mood normal.         Speech: Speech normal.         Behavior: Behavior normal. Behavior is cooperative.         Thought Content: Thought content normal.         Cognition and Memory: Cognition and memory normal.         Judgment: Judgment normal.         Data Review (reviewed with patient):     Procedures    Lab Results   Component Value Date    GLUCOSE 144 (H) 04/12/2017    BUN 15 06/19/2020    CREATININE 0.85 06/19/2020    EGFRIFNONA 67 06/19/2020    EGFRIFAFRI 82 06/19/2020    BCR 17.6 06/19/2020    K 4.0 06/19/2020    CO2 26.6 06/19/2020    CALCIUM 9.6 06/19/2020    ALBUMIN 4.40 04/12/2017    ALKPHOS 77 04/12/2017    AST 41 (H) 04/12/2017    ALT 38 04/12/2017      Lab Results   Component Value Date    CHLPL 169 03/18/2016    TRIG 253 (H) 03/18/2016    HDL 45 03/18/2016     03/18/2016     Lab Results   Component Value Date    HGBA1C 5.40 04/12/2017     Lab Results   Component Value Date    WBC 8.9 11/15/2015    HGB 14.5 11/15/2015    HCT 45.0 11/15/2015    MCV 92.5 11/15/2015     11/15/2015     Lab Results   Component Value Date    TSH 2.266 04/12/2017            Problem List Items Addressed This Visit        Cardiovascular and Mediastinum    Essential hypertension    Overview     • Target blood pressure <130/80 mmHg         Hyperlipidemia LDL goal <100    Overview     · Statin therapy reasonable for primary prevention         Palpitations - Primary    Overview     · Echo (6/19/2020): LVEF 70%.  No significant valvular abnormality.  No mitral valve prolapse.         Current Assessment & Plan     · Place 2 week monitor for increased palpitations         Relevant Orders    Mobile Cardiac Outpatient Telemetry    Pre-syncope    Current Assessment & Plan     · Negative orthostatics reported by patient  · Place 2 week monitor           RBBB (right bundle branch block)    Overview     · Reportedly  moderate mitral regurgitation on echocardiogram, 2009.   · Echocardiogram (02/21/2013): LVEF 60%, trace MR.   · Echocardiogram (08/13/2015):  LVEF > than 65%. No hemodynamically significant valvular disease.            Respiratory    Dyspnea on exertion    Overview     · Echo (6/19/2020): LVEF 70%.  No significant valvular abnormality.  No mitral valve prolapse.  · Pharmacologic nuclear stress (6/17/2020): No ischemia/infarct.  Normal LVEF         Current Assessment & Plan     · Patient denies further shortness of breath            Nervous and Auditory    Chest pain syndrome    Overview     · Remote cardiac catheterization revealing normal coronary arteries, data deficit   · Nuclear stress test (04/10/2012): No ischemia/infarct  · Recurrent chest pain at [facility] ER presentation with negative enzymes and EKG, 7/29/2013  · Pharmacologic nuclear stress (03/18/2016): No ischemia.  · Pharmacologic nuclear stress (6/17/2020): Normal perfusion.  Normal LVEF.         Current Assessment & Plan     · Patient denies any further chest pain             The patient is having lightheadedness associated with weakness.  She could be having a potential arrhythmia or pauses.  I will mail her a 2-week monitor.  She is obviously not having orthostatic hypotension as orthostatics were normal.  We will continue her current medications and I will have her follow-up in 6 weeks after the results of her monitor are known.       · Place 2-week monitor  Return in about 6 weeks (around 2/2/2021), or if symptoms worsen or fail to improve, for Follow-up with Dr. Ga next visit.      Anjali OMER      This patient has consented to a telehealth visit via telephone. The visit was scheduled as a telephone visit to comply with patient safety concerns in accordance with CDC recommendations.  All vitals recorded within this visit are reported by the patient.  I spent 20 minutes in total including but not limited to the 25  minutes spent in direct conversation with this patient.   12/22/2020

## 2020-12-22 ENCOUNTER — OFFICE VISIT (OUTPATIENT)
Dept: CARDIOLOGY | Facility: CLINIC | Age: 65
End: 2020-12-22

## 2020-12-22 VITALS
SYSTOLIC BLOOD PRESSURE: 161 MMHG | HEIGHT: 63 IN | HEART RATE: 97 BPM | DIASTOLIC BLOOD PRESSURE: 79 MMHG | WEIGHT: 168 LBS | BODY MASS INDEX: 29.77 KG/M2

## 2020-12-22 DIAGNOSIS — R55 PRE-SYNCOPE: ICD-10-CM

## 2020-12-22 DIAGNOSIS — E78.5 HYPERLIPIDEMIA LDL GOAL <100: ICD-10-CM

## 2020-12-22 DIAGNOSIS — I45.10 RBBB (RIGHT BUNDLE BRANCH BLOCK): ICD-10-CM

## 2020-12-22 DIAGNOSIS — R07.9 CHEST PAIN SYNDROME: ICD-10-CM

## 2020-12-22 DIAGNOSIS — R00.2 PALPITATIONS: Primary | ICD-10-CM

## 2020-12-22 DIAGNOSIS — I10 ESSENTIAL HYPERTENSION: ICD-10-CM

## 2020-12-22 DIAGNOSIS — R06.09 DYSPNEA ON EXERTION: ICD-10-CM

## 2020-12-22 PROCEDURE — 99443 PR PHYS/QHP TELEPHONE EVALUATION 21-30 MIN: CPT | Performed by: NURSE PRACTITIONER

## 2020-12-22 RX ORDER — ASPIRIN 81 MG/1
81 TABLET ORAL DAILY
COMMUNITY
End: 2021-03-12 | Stop reason: SDUPTHER

## 2020-12-28 ENCOUNTER — TRANSCRIBE ORDERS (OUTPATIENT)
Dept: ADMINISTRATIVE | Facility: HOSPITAL | Age: 65
End: 2020-12-28

## 2020-12-28 DIAGNOSIS — Z01.818 PRE-OPERATIVE CLEARANCE: Primary | ICD-10-CM

## 2020-12-29 ENCOUNTER — LAB (OUTPATIENT)
Dept: LAB | Facility: HOSPITAL | Age: 65
End: 2020-12-29

## 2020-12-29 DIAGNOSIS — Z01.818 PRE-OPERATIVE CLEARANCE: ICD-10-CM

## 2020-12-29 PROCEDURE — U0004 COV-19 TEST NON-CDC HGH THRU: HCPCS | Performed by: SURGERY

## 2020-12-29 PROCEDURE — C9803 HOPD COVID-19 SPEC COLLECT: HCPCS

## 2020-12-30 LAB — SARS-COV-2 RNA RESP QL NAA+PROBE: NOT DETECTED

## 2021-01-28 ENCOUNTER — TELEPHONE (OUTPATIENT)
Dept: CARDIOLOGY | Facility: CLINIC | Age: 66
End: 2021-01-28

## 2021-01-28 DIAGNOSIS — Z79.899 LONG-TERM USE OF HIGH-RISK MEDICATION: Primary | ICD-10-CM

## 2021-02-01 RX ORDER — LOSARTAN POTASSIUM 25 MG/1
50 TABLET ORAL DAILY
COMMUNITY
End: 2021-03-12 | Stop reason: SDUPTHER

## 2021-02-01 NOTE — TELEPHONE ENCOUNTER
How much bumex is she taking?  She may need to double it and get BMP in 2 weeks if only taking daily

## 2021-02-01 NOTE — TELEPHONE ENCOUNTER
"The records are scanned under the media tab as external records.  The patient states that her edema has improved and she has lost around 6lbs since last Friday.  States she still has generalized edema all over, but \"it's a lot better\".  Vitals this afternoon were /60's, and HR 85.  She denies CP.  States she is \"a little short of breath, but it's much better\".  She does complain of SOA with exertion.      After further discussing with the patient - she states she has been having the edema on and off for approx two months.  Please advise.  "

## 2021-02-02 ENCOUNTER — TELEPHONE (OUTPATIENT)
Dept: PSYCHIATRY | Facility: CLINIC | Age: 66
End: 2021-02-02

## 2021-02-02 NOTE — TELEPHONE ENCOUNTER
Patient will increase Bumex to 2mg daily for 2 weeks.  She will also start taking potassium 20meq daily for those 2 weeks.  Repeat BMP in 2 weeks.

## 2021-02-23 ENCOUNTER — TELEPHONE (OUTPATIENT)
Dept: CARDIOLOGY | Facility: CLINIC | Age: 66
End: 2021-02-23

## 2021-03-12 ENCOUNTER — OFFICE VISIT (OUTPATIENT)
Dept: CARDIOLOGY | Facility: CLINIC | Age: 66
End: 2021-03-12

## 2021-03-12 VITALS
HEIGHT: 63 IN | SYSTOLIC BLOOD PRESSURE: 110 MMHG | HEART RATE: 74 BPM | BODY MASS INDEX: 30.55 KG/M2 | WEIGHT: 172.4 LBS | OXYGEN SATURATION: 95 % | DIASTOLIC BLOOD PRESSURE: 62 MMHG

## 2021-03-12 DIAGNOSIS — I10 ESSENTIAL HYPERTENSION: ICD-10-CM

## 2021-03-12 DIAGNOSIS — R07.9 CHEST PAIN SYNDROME: Chronic | ICD-10-CM

## 2021-03-12 DIAGNOSIS — R00.2 PALPITATIONS: ICD-10-CM

## 2021-03-12 DIAGNOSIS — E78.5 HYPERLIPIDEMIA LDL GOAL <100: ICD-10-CM

## 2021-03-12 DIAGNOSIS — I50.32 CHRONIC HEART FAILURE WITH PRESERVED EJECTION FRACTION (HCC): Primary | ICD-10-CM

## 2021-03-12 PROBLEM — Z01.810 PREOPERATIVE CARDIOVASCULAR EXAMINATION: Status: RESOLVED | Noted: 2020-06-19 | Resolved: 2021-03-12

## 2021-03-12 PROBLEM — R06.09 DYSPNEA ON EXERTION: Chronic | Status: ACTIVE | Noted: 2017-04-12

## 2021-03-12 PROCEDURE — 99214 OFFICE O/P EST MOD 30 MIN: CPT | Performed by: INTERNAL MEDICINE

## 2021-03-12 RX ORDER — LOSARTAN POTASSIUM 50 MG/1
50 TABLET ORAL DAILY
Qty: 90 TABLET | Refills: 3 | Status: SHIPPED | OUTPATIENT
Start: 2021-03-12 | End: 2021-12-03 | Stop reason: SDUPTHER

## 2021-03-12 RX ORDER — LANOLIN ALCOHOL/MO/W.PET/CERES
1000 CREAM (GRAM) TOPICAL DAILY
COMMUNITY
End: 2021-12-03

## 2021-03-12 RX ORDER — NITROGLYCERIN 0.4 MG/1
0.4 TABLET SUBLINGUAL
Qty: 25 TABLET | Refills: 5 | Status: SHIPPED | OUTPATIENT
Start: 2021-03-12

## 2021-03-12 RX ORDER — FEXOFENADINE HCL 180 MG/1
180 TABLET ORAL DAILY
Status: ON HOLD | COMMUNITY
End: 2022-08-11

## 2021-03-12 RX ORDER — SIMVASTATIN 40 MG
40 TABLET ORAL NIGHTLY
Refills: 2
Start: 2021-03-12 | End: 2021-12-03

## 2021-03-12 RX ORDER — ASPIRIN 81 MG/1
81 TABLET ORAL DAILY
Start: 2021-03-12 | End: 2021-12-03

## 2021-03-12 NOTE — ASSESSMENT & PLAN NOTE
· Stage C HFpEF with NYHA III symptoms  · Continue losartan  · Continue Bumex  · Treadmill stress test to evaluate symptoms

## 2021-03-12 NOTE — PROGRESS NOTES
"[facility]      Identification: Elsa Porras is a 66 y.o. female who resides in [place], [place].    Reason for visit:  LORD, MITRAL VALVE DISEASE, Edema, and Fatigue      Subjective      Elsa Porras presents to [facility], [place], operated by [facility] for followup.    History of Present Illness    Ms. Porras reports shortness of breath with exertion and lower extremity swelling.  This has been a chronic problem that she thinks is getting worse.  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  She complains of increased fatigue.  She states her blood pressures have improved with increase in Bumex    She had an echocardiogram done at [facility] as well as blood work.  The report of the echo is noted below.  We do not have lab results.    Review of Systems   Constitutional: Positive for fever and malaise/fatigue.   Cardiovascular: Positive for leg swelling.   Respiratory: Positive for shortness of breath.        Objective     /62 (BP Location: Right arm, Patient Position: Sitting)   Pulse 74   Ht 160 cm (63\")   Wt 78.2 kg (172 lb 6.4 oz)   SpO2 95%   BMI 30.54 kg/m²       Constitutional:       Appearance: Well-developed.   Eyes:      General: No scleral icterus.     Pupils: Pupils are equal, round, and reactive to light.   HENT:      Head: Normocephalic and atraumatic.   Neck:      Thyroid: No thyromegaly.      Vascular: No carotid bruit or JVD.   Pulmonary:      Effort: Pulmonary effort is normal.      Breath sounds: Normal breath sounds.   Cardiovascular:      Normal rate. Regular rhythm.      Murmurs: There is no murmur.      No gallop.   Abdominal:      Palpations: Abdomen is soft. There is no abdominal mass.      Tenderness: There is no abdominal tenderness.   Musculoskeletal:      Extremities: No clubbing present.Skin:     General: Skin is warm and dry. There is no cyanosis.   Neurological:      Mental Status: Alert and oriented to person, place, and time.   Psychiatric:    "      Behavior: Behavior normal.         Result Review :    Lab Results   Component Value Date     (H) 03/12/2021    BUN 16 03/12/2021    CREATININE 1.11 (H) 03/12/2021    EGFRIFNONA 49 (L) 03/12/2021    EGFRIFAFRI 60 (L) 03/12/2021    BCR 14.4 03/12/2021    K 3.7 03/12/2021    CO2 29.3 (H) 03/12/2021    CALCIUM 9.4 03/12/2021    ALBUMIN 4.40 04/12/2017    AST 41 (H) 04/12/2017    ALT 38 04/12/2017                      Problem List Items Addressed This Visit        Cardiology Problems    Chronic heart failure with preserved ejection fraction (CMS/HCC) - Primary    Overview     · Echo (6/19/2020): LVEF 70%.  No significant valvular abnormality.  No mitral valve prolapse.  · Pharmacologic nuclear stress (6/17/2020): No ischemia/infarct.  Normal LVEF  · Echo at [facility] (2/24/2021): LVEF 60%.  Grade 2 diastolic dysfunction.  Mild MR         Current Assessment & Plan     · Stage C HFpEF with NYHA III symptoms  · Continue losartan  · Continue Bumex  · Treadmill stress test to evaluate symptoms         Relevant Medications    losartan (COZAAR) 50 MG tablet    nitroglycerin (NITROSTAT) 0.4 MG SL tablet    Other Relevant Orders    Treadmill Stress Test    proBNP    Basic Metabolic Panel    Palpitations    Overview     · Echo (6/19/2020): LVEF 70%.  No significant valvular abnormality.  No mitral valve prolapse.  · MCOT (12/30/2020): Benign monitor. Short-lived NSVT. No sustained arrhythmia.         Essential hypertension (Chronic)    Overview     • Target blood pressure <130/80 mmHg         Current Assessment & Plan     · Controlled  · Continue present medical therapy         Relevant Medications    losartan (COZAAR) 50 MG tablet    Hyperlipidemia LDL goal <100 (Chronic)    Overview     · Statin therapy reasonable for primary prevention         Current Assessment & Plan     · Continue simvastatin for primary prevention         Relevant Medications    simvastatin (ZOCOR) 40 MG tablet       Other    Chest pain  syndrome (Chronic)    Overview     · Remote cardiac catheterization revealing normal coronary arteries, data deficit   · Nuclear stress test (04/10/2012): No ischemia/infarct  · Recurrent chest pain at [facility] ER presentation with negative enzymes and EKG, 7/29/2013  · Pharmacologic nuclear stress (03/18/2016): No ischemia.  · Pharmacologic nuclear stress (6/17/2020): Normal perfusion.  Normal LVEF.         Relevant Medications    aspirin 81 MG EC tablet    nitroglycerin (NITROSTAT) 0.4 MG SL tablet               · Graded exercise stress test to evaluate shortness of breath symptoms  · proBNP today  · Patient instructed to obtain echo film and bring to office for review  · Patient instructed to obtain lab work from Dr. Harris's office and bring to office for review    Follow-up   Return in about 6 months (around 9/12/2021).        Sabas Ga MD, [facility], [facility]  3/15/2021     Scribed for Binh Ga IV, MD by LEELEE COSBY.  03/12/21   16:55 EST    I have personally performed the services described in this document as scribed by the above individual, and it is both accurate and complete.  Binh Ga IV, MD  3/15/2021  17:08 EST

## 2021-03-13 LAB
BUN SERPL-MCNC: 16 MG/DL (ref 8–23)
BUN/CREAT SERPL: 14.4 (ref 7–25)
CALCIUM SERPL-MCNC: 9.4 MG/DL (ref 8.6–10.5)
CHLORIDE SERPL-SCNC: 102 MMOL/L (ref 98–107)
CO2 SERPL-SCNC: 29.3 MMOL/L (ref 22–29)
CREAT SERPL-MCNC: 1.11 MG/DL (ref 0.57–1)
GLUCOSE SERPL-MCNC: 113 MG/DL (ref 65–99)
NT-PROBNP SERPL-MCNC: 135 PG/ML (ref 0–301)
POTASSIUM SERPL-SCNC: 3.7 MMOL/L (ref 3.5–5.2)
SODIUM SERPL-SCNC: 144 MMOL/L (ref 136–145)

## 2021-03-16 NOTE — PROGRESS NOTES
Let her know that her labs look okay.  They do not suggest heart failure as a cause of her shortness of breath.

## 2021-03-22 ENCOUNTER — HOSPITAL ENCOUNTER (OUTPATIENT)
Dept: CARDIOLOGY | Facility: HOSPITAL | Age: 66
End: 2021-03-22

## 2021-03-24 ENCOUNTER — OFFICE VISIT (OUTPATIENT)
Dept: CARDIOLOGY | Facility: CLINIC | Age: 66
End: 2021-03-24

## 2021-03-24 ENCOUNTER — HOSPITAL ENCOUNTER (OUTPATIENT)
Dept: CARDIOLOGY | Facility: HOSPITAL | Age: 66
Discharge: HOME | End: 2021-03-24
Admitting: INTERNAL MEDICINE

## 2021-03-24 VITALS
OXYGEN SATURATION: 96 % | BODY MASS INDEX: 30.48 KG/M2 | DIASTOLIC BLOOD PRESSURE: 78 MMHG | SYSTOLIC BLOOD PRESSURE: 128 MMHG | WEIGHT: 172 LBS | HEIGHT: 63 IN | HEART RATE: 74 BPM

## 2021-03-24 VITALS — BODY MASS INDEX: 29.77 KG/M2 | WEIGHT: 168 LBS | HEIGHT: 63 IN

## 2021-03-24 DIAGNOSIS — R06.09 DYSPNEA ON EXERTION: ICD-10-CM

## 2021-03-24 DIAGNOSIS — R07.9 CHEST PAIN SYNDROME: Chronic | ICD-10-CM

## 2021-03-24 DIAGNOSIS — I10 ESSENTIAL HYPERTENSION: Chronic | ICD-10-CM

## 2021-03-24 DIAGNOSIS — I50.32 CHRONIC HEART FAILURE WITH PRESERVED EJECTION FRACTION (HCC): ICD-10-CM

## 2021-03-24 DIAGNOSIS — I50.32 CHRONIC HEART FAILURE WITH PRESERVED EJECTION FRACTION (HCC): Primary | ICD-10-CM

## 2021-03-24 DIAGNOSIS — E78.5 HYPERLIPIDEMIA LDL GOAL <100: Chronic | ICD-10-CM

## 2021-03-24 LAB
BH CV STRESS BP STAGE 1: NORMAL
BH CV STRESS DURATION MIN STAGE 1: 3
BH CV STRESS DURATION MIN STAGE 2: 1
BH CV STRESS DURATION SEC STAGE 1: 0
BH CV STRESS DURATION SEC STAGE 2: 5
BH CV STRESS GRADE STAGE 1: 10
BH CV STRESS GRADE STAGE 2: 12
BH CV STRESS HR STAGE 1: 123
BH CV STRESS HR STAGE 2: 135
BH CV STRESS METS STAGE 1: 5
BH CV STRESS METS STAGE 2: 5.5
BH CV STRESS O2 STAGE 1: 96
BH CV STRESS O2 STAGE 2: 95
BH CV STRESS PROTOCOL 1: NORMAL
BH CV STRESS RECOVERY BP: NORMAL MMHG
BH CV STRESS RECOVERY HR: 80 BPM
BH CV STRESS RECOVERY O2: 98 %
BH CV STRESS SPEED STAGE 1: 1.7
BH CV STRESS SPEED STAGE 2: 2.5
BH CV STRESS STAGE 1: 1
BH CV STRESS STAGE 2: 2
MAXIMAL PREDICTED HEART RATE: 155 BPM
PERCENT MAX PREDICTED HR: 87.74 %
STRESS BASELINE BP: NORMAL MMHG
STRESS BASELINE HR: 66 BPM
STRESS O2 SAT REST: 95 %
STRESS PERCENT HR: 103 %
STRESS POST ESTIMATED WORKLOAD: 5.5 METS
STRESS POST EXERCISE DUR MIN: 4 MIN
STRESS POST EXERCISE DUR SEC: 5 SEC
STRESS POST O2 SAT PEAK: 95 %
STRESS POST PEAK BP: NORMAL MMHG
STRESS POST PEAK HR: 136 BPM
STRESS TARGET HR: 132 BPM

## 2021-03-24 PROCEDURE — 93018 CV STRESS TEST I&R ONLY: CPT | Performed by: INTERNAL MEDICINE

## 2021-03-24 PROCEDURE — 93017 CV STRESS TEST TRACING ONLY: CPT

## 2021-03-24 PROCEDURE — 99214 OFFICE O/P EST MOD 30 MIN: CPT | Performed by: INTERNAL MEDICINE

## 2021-03-24 RX ORDER — SULFAMETHOXAZOLE AND TRIMETHOPRIM 800; 160 MG/1; MG/1
TABLET ORAL 2 TIMES DAILY
COMMUNITY
Start: 2021-03-16 | End: 2021-12-03

## 2021-03-24 RX ORDER — BUMETANIDE 2 MG/1
2 TABLET ORAL DAILY
Qty: 90 TABLET | Refills: 3
Start: 2021-03-24 | End: 2021-12-03 | Stop reason: SDUPTHER

## 2021-03-24 NOTE — ASSESSMENT & PLAN NOTE
· Stage C HFpEF with NYHA class III symptoms  · We will screen for cardiac amyloid   · Refer to pulmonary for lung evaluation

## 2021-03-24 NOTE — PROGRESS NOTES
"[facility]      Identification: Elsa Porras is a 66 y.o. female who resides in [place]    Reason for visit:  HFpEF  Follow-up GXT        Subjective      Elsa Porras presents to [facility] for followup.    Ms. Porras reports that she became dyspneic, lightheaded, and developed weakness in her legs during her treadmill stress test.  She continues to get winded doing simple tasks such as washing dishes.  She also reports pain in her back when washing dishes.  She did not have any back pain with her exercise stress test this morning    The patient wonders whether her symptoms may be related to her lung.  She is never seen a pulmonologist or undergone pulmonary function test.    The patient reports that she has been trying to lose weight.          Objective     /78 (BP Location: Right arm, Patient Position: Sitting)   Pulse 74   Ht 160 cm (63\")   Wt 78 kg (172 lb)   SpO2 96%   BMI 30.47 kg/m²       Constitutional:       Appearance: Healthy appearance. Well-developed.   Eyes:      General: No scleral icterus.  HENT:      Head: Normocephalic and atraumatic.   Neck:      Thyroid: No thyromegaly.      Vascular: No carotid bruit or JVD.   Pulmonary:      Effort: Pulmonary effort is normal.      Breath sounds: Normal breath sounds.   Cardiovascular:      Normal rate. Regular rhythm.      Murmurs: There is no murmur.      No gallop.   Abdominal:      Palpations: Abdomen is soft. There is no abdominal mass.      Tenderness: There is no abdominal tenderness.   Musculoskeletal:      Extremities: No clubbing present.Skin:     General: Skin is warm and dry. There is no cyanosis.   Neurological:      Mental Status: Alert.   Psychiatric:         Attention and Perception: Attention normal.         Behavior: Behavior normal.         Result Review :    GXT reviewed.  Patient exercised for 4 minutes (6 minutes expected).  No ischemic EKG changes.  Normal heart rate and blood pressure " response to exercise.    Labs (3/10/2021):  · Creat 1.11, BUN 21, Na 141, K 3.9, AST 20, ALT 20        Assessment     Problem List Items Addressed This Visit        Cardiology Problems    Chronic heart failure with preserved ejection fraction (CMS/HCC) - Primary (Chronic)    Overview     · Echo (6/19/2020): LVEF 70%.  No significant valvular abnormality.  No mitral valve prolapse.  · Pharmacologic nuclear stress (6/17/2020): No ischemia/infarct.  Normal LVEF  · Echo at [facility] (2/24/2021): LVEF 60%.  Grade 2 diastolic dysfunction.  Mild MR  · GXT (3/24/2021): Moderately reduced exercise capacity.  No ischemia on EKG.  Normal HR/BP response exercise.         Current Assessment & Plan     · Stage C HFpEF with NYHA class III symptoms  · We will screen for cardiac amyloid   · Refer to pulmonary for lung evaluation         Relevant Medications    bumetanide (BUMEX) 2 MG tablet    Other Relevant Orders    Protein Elec + Interp, Serum    Protein Electrophoresis, 24 Hr Urine - Urine, Clean Catch    Immunoglobulin Free LT Chains Blood    Essential hypertension (Chronic)    Overview     • Target blood pressure <130/80 mmHg         Current Assessment & Plan     · Controlled  · Continue present medical therapy         Relevant Medications    bumetanide (BUMEX) 2 MG tablet    Hyperlipidemia LDL goal <100 (Chronic)    Overview     · Statin therapy reasonable for primary prevention         Current Assessment & Plan     · Continue simvastatin            Other    Chest pain syndrome (Chronic)    Overview     · Remote cardiac catheterization revealing normal coronary arteries, data deficit   · Nuclear stress test (04/10/2012): No ischemia/infarct  · Recurrent chest pain at [facility] ER presentation with negative enzymes and EKG, 7/29/2013  · Pharmacologic nuclear stress (03/18/2016): No ischemia.  · Pharmacologic nuclear stress (6/17/2020): Normal perfusion.  Normal LVEF.  · GXT (3/24/2021): Moderate reduced  exercise capacity with no ischemic EKG changes.  Normal heart rate/blood pressure response to exercise         Current Assessment & Plan     · No evidence of obstructive CAD on GXT today           Other Visit Diagnoses     Dyspnea on exertion        Relevant Medications    bumetanide (BUMEX) 2 MG tablet    Other Relevant Orders    Ambulatory Referral to Pulmonology          Plan   • Refer to pulmonary medicine for evaluation of dyspnea  • Screen for amyloidosis with serum and urine protein electrophoresis      Follow-up   Return in about 8 months (around 11/24/2021).        Sabas Ga MD, [facility], [facility]  3/24/2021     Scribed for Binh Ga IV, MD by Yael Dior.  03/24/21   12:08 EDT    I have personally performed the services described in this document as scribed by the above individual, and it is both accurate and complete.  Binh Ga IV, MD  3/24/2021  13:08 EDT

## 2021-03-25 ENCOUNTER — HOSPITAL ENCOUNTER (OUTPATIENT)
Dept: CARDIOLOGY | Facility: HOSPITAL | Age: 66
Discharge: HOME | End: 2021-03-25

## 2021-03-25 DIAGNOSIS — Z00.6 EXAMINATION FOR NORMAL COMPARISON OR CONTROL IN CLINICAL RESEARCH: ICD-10-CM

## 2021-03-26 ENCOUNTER — LAB (OUTPATIENT)
Dept: LAB | Facility: HOSPITAL | Age: 66
End: 2021-03-26

## 2021-03-26 DIAGNOSIS — I50.32 CHRONIC HEART FAILURE WITH PRESERVED EJECTION FRACTION (HCC): ICD-10-CM

## 2021-03-26 PROCEDURE — 84165 PROTEIN E-PHORESIS SERUM: CPT

## 2021-03-26 PROCEDURE — 83883 ASSAY NEPHELOMETRY NOT SPEC: CPT

## 2021-03-26 PROCEDURE — 84155 ASSAY OF PROTEIN SERUM: CPT

## 2021-03-26 PROCEDURE — 36415 COLL VENOUS BLD VENIPUNCTURE: CPT

## 2021-03-26 PROCEDURE — 83880 ASSAY OF NATRIURETIC PEPTIDE: CPT

## 2021-03-26 PROCEDURE — 80048 BASIC METABOLIC PNL TOTAL CA: CPT

## 2021-03-27 LAB
ANION GAP SERPL CALCULATED.3IONS-SCNC: 13.2 MMOL/L (ref 5–15)
BUN SERPL-MCNC: 15 MG/DL (ref 8–23)
BUN/CREAT SERPL: 12.7 (ref 7–25)
CALCIUM SPEC-SCNC: 9.4 MG/DL (ref 8.6–10.5)
CHLORIDE SERPL-SCNC: 101 MMOL/L (ref 98–107)
CO2 SERPL-SCNC: 26.8 MMOL/L (ref 22–29)
CREAT SERPL-MCNC: 1.18 MG/DL (ref 0.57–1)
GFR SERPL CREATININE-BSD FRML MDRD: 46 ML/MIN/1.73
GLUCOSE SERPL-MCNC: 68 MG/DL (ref 65–99)
KAPPA LC FREE SER-MCNC: 19.1 MG/L (ref 3.3–19.4)
KAPPA LC FREE/LAMBDA FREE SER: 1.07 {RATIO} (ref 0.26–1.65)
LAMBDA LC FREE SERPL-MCNC: 17.8 MG/L (ref 5.7–26.3)
NT-PROBNP SERPL-MCNC: 50.5 PG/ML (ref 0–900)
POTASSIUM SERPL-SCNC: 3.3 MMOL/L (ref 3.5–5.2)
SODIUM SERPL-SCNC: 141 MMOL/L (ref 136–145)

## 2021-03-29 LAB
ALBUMIN SERPL ELPH-MCNC: 3.3 G/DL (ref 2.9–4.4)
ALBUMIN/GLOB SERPL: 1.1 {RATIO} (ref 0.7–1.7)
ALPHA1 GLOB SERPL ELPH-MCNC: 0.2 G/DL (ref 0–0.4)
ALPHA2 GLOB SERPL ELPH-MCNC: 1.1 G/DL (ref 0.4–1)
B-GLOBULIN SERPL ELPH-MCNC: 1.1 G/DL (ref 0.7–1.3)
GAMMA GLOB SERPL ELPH-MCNC: 0.7 G/DL (ref 0.4–1.8)
GLOBULIN SER CALC-MCNC: 3.1 G/DL (ref 2.2–3.9)
LABORATORY COMMENT REPORT: ABNORMAL
M PROTEIN SERPL ELPH-MCNC: ABNORMAL G/DL
PROT PATTERN SERPL ELPH-IMP: ABNORMAL
PROT SERPL-MCNC: 6.4 G/DL (ref 6–8.5)

## 2021-03-30 ENCOUNTER — LAB (OUTPATIENT)
Dept: LAB | Facility: HOSPITAL | Age: 66
End: 2021-03-30

## 2021-03-30 DIAGNOSIS — I50.32 CHRONIC HEART FAILURE WITH PRESERVED EJECTION FRACTION (HCC): ICD-10-CM

## 2021-03-30 PROCEDURE — 84166 PROTEIN E-PHORESIS/URINE/CSF: CPT

## 2021-03-30 PROCEDURE — 84156 ASSAY OF PROTEIN URINE: CPT

## 2021-04-01 LAB
ALBUMIN 24H MFR UR ELPH: 45.8 %
ALPHA1 GLOB 24H MFR UR ELPH: 6.3 %
ALPHA2 GLOB 24H MFR UR ELPH: 20.7 %
B-GLOBULIN 24H MFR UR ELPH: 17 %
GAMMA GLOB 24H MFR UR ELPH: 10.2 %
LABORATORY COMMENT REPORT: NORMAL
M PROTEIN 24H MFR UR ELPH: NORMAL %
PROT 24H UR-MRATE: <50 MG/24 HR (ref 30–150)
PROT UR-MCNC: <4 MG/DL

## 2021-06-10 ENCOUNTER — OFFICE VISIT (OUTPATIENT)
Dept: PSYCHIATRY | Facility: CLINIC | Age: 66
End: 2021-06-10

## 2021-06-10 VITALS
SYSTOLIC BLOOD PRESSURE: 135 MMHG | DIASTOLIC BLOOD PRESSURE: 71 MMHG | BODY MASS INDEX: 29.84 KG/M2 | WEIGHT: 168.4 LBS | HEIGHT: 63 IN | HEART RATE: 70 BPM

## 2021-06-10 DIAGNOSIS — Z79.899 MEDICATION MANAGEMENT: ICD-10-CM

## 2021-06-10 DIAGNOSIS — F34.1 DYSTHYMIC DISORDER: Primary | ICD-10-CM

## 2021-06-10 PROCEDURE — 99214 OFFICE O/P EST MOD 30 MIN: CPT | Performed by: PSYCHIATRY & NEUROLOGY

## 2021-06-10 RX ORDER — ALPRAZOLAM 0.5 MG/1
TABLET, EXTENDED RELEASE ORAL
Qty: 90 TABLET | Refills: 5 | Status: SHIPPED | OUTPATIENT
Start: 2021-06-10 | End: 2021-12-13

## 2021-06-10 NOTE — PROGRESS NOTES
"Patient ID: Elsa Porras is a 66 y.o. female    SERVICE TYPE: EVALUATION AND MANAGEMENT (greater than 50% of the time spent for supportive psychotherapy).      /71   Pulse 70   Ht 160 cm (62.99\")   Wt 76.4 kg (168 lb 6.4 oz)   BMI 29.84 kg/m²     ALLERGIES:  Codeine, Isosorbide nitrate, Levaquin [levofloxacin], Lorcet [hydrocodone-acetaminophen], Other, Percocet [oxycodone-acetaminophen], and Ultram [tramadol]    CC/ Focus of the visit: anxiety/ depression     HPI: No clinic depression and coping with her medical issues. Activities, interest limited my her medical conditions. Sleeping normally. Enjoying her grandson.     No indication of prescription misuse or substance abuse.    PFSH:  supportive with his own problems.     Review of Systems  Recent episode of CHF complicated by Renal failure back towards normal now - treatment per pcp Nando.     SUPPORTIVE PSYCHOTHERAPY: continuing efforts to promote the therapeutic alliance, address the patient’s issues, and strengthen self awareness, insights, and positive coping skills such as Exercising, listen to music, spending time in nature and utilizing resources.     Mental Status Exam  Appearance: Appropriate  Attitude toward clinician:  cooperative and agreeable   Speech:    Rate:  regular rate and rhythm   Volume: normal  Motor:  no abnormal movements   Mood:  Good  Affect:  euthymic  Thought Processes:  linear, logical, and goal directed  Thought Content:  Normal   Feeling Hopeless: absent  Suicidal Thoughts or Intent:  absent  Homicidal Thoughts:  absent  Perceptual Disturbance: no perceptual disturbance  Attention and Concentration:  good  Insight and Judgement:  good  Memory:  memory appears to be intact    LABS:   Recent Results (from the past 168 hour(s))   [facility] Drug Screen    Collection Time: 06/10/21  1:37 PM    Specimen: Urine, Clean Catch   Result Value Ref Range    External Amphetamine Screen Urine Negative     Amphetamine Cut-Off " 1000NG/ML     External Benzodiazepine Screen Urine Positive     Benzodiazipine Cut-Off 300NG/ML     External Cocaine Screen Urine Negative     Cocaine Cut-Off 300NG/ML     External THC Screen Urine Negative     THC Cut-Off 50NG/ML     External Methadone Screen Urine Negative     Methadone Cut-Off 300NG/ML     External Methamphetamine Screen Urine Negative     Methamphetamine Cut-Off 1000NG/ML     External Oxycodone Screen Urine Negative     Oxycodone Cut-Off 100NG/ML     External Buprenorphine Screen Urine Negative     Buprenorphine Cut-Off 10NG/ML     External MDMA Negative     MDMA Cut-Off 500NG/ML     External Opiates Screen Urine Negative     Opiates Cut-Off 300NG/ML        MEDICATION ISSUES: Have discussed with the patient the medications Risks, Benefits, and Side effects including potential falls, possible impaired driving and  metabolic adversities among others. No medication side effects or related complaints today.     TREATMENT PLAN/GOALS: Continue supportive psychotherapy efforts and medications as indicated.     Current Outpatient Medications   Medication Sig Dispense Refill   • ALPRAZolam XR (XANAX XR) 0.5 MG 24 hr tablet TAKE 1 TABLET BY MOUTH EVERY MORNING AND 2 TABLETS BY MOUTH EVERY EVENING FOR ANXIETY 90 tablet 5   • bumetanide (BUMEX) 2 MG tablet Take 1 tablet by mouth Daily. (Patient taking differently: Take 1 mg by mouth Daily.) 90 tablet 3   • citalopram (CeleXA) 20 MG tablet Take 1 tablet by mouth Every Night. 90 tablet 1   • levothyroxine (SYNTHROID, LEVOTHROID) 50 MCG tablet Take 75 mcg by mouth Daily.     • nitroglycerin (NITROSTAT) 0.4 MG SL tablet Place 1 tablet under the tongue Every 5 (Five) Minutes As Needed for Chest Pain. 25 tablet 5   • polyethylene glycol (MIRALAX) powder PLACE 17 GRAMS IN JUICE OR WATER THEN STIR AND DRINK ONCE A DAY FOR CONSTIPATION  2   • QUEtiapine (SEROquel) 50 MG tablet One or two at hs. (Patient taking differently: 100 mg Every Night. One or two at hs.) 180  tablet 1   • simvastatin (ZOCOR) 40 MG tablet Take 1 tablet by mouth Every Night.  2   • allopurinol (ZYLOPRIM) 300 MG tablet 300 mg Daily.  2   • aspirin 81 MG EC tablet Take 1 tablet by mouth Daily.     • Cholecalciferol (VITAMIN D PO) Take 50,000 Units by mouth Every 30 (Thirty) Days.     • famotidine (PEPCID) 20 MG tablet 20 mg Daily.     • fexofenadine (ALLEGRA) 180 MG tablet Take 180 mg by mouth Daily.     • losartan (COZAAR) 50 MG tablet Take 1 tablet by mouth Daily. 90 tablet 3   • montelukast (SINGULAIR) 10 MG tablet TAKE 1 TABLET BY MOUTH ONCE A DAY AT BEDTIME AS DIRECTED BY YOUR PRESCRIBER TO PREVENT ASTHMA ATTACKS OR ALLERGIES.  0   • spironolactone (ALDACTONE) 25 MG tablet Take 1 tablet by mouth Daily. 90 tablet 1   • sulfamethoxazole-trimethoprim (BACTRIM DS,SEPTRA DS) 800-160 MG per tablet 2 (two) times a day.     • vitamin B-12 (CYANOCOBALAMIN) 1000 MCG tablet Take 1,000 mcg by mouth Daily.       No current facility-administered medications for this visit.       COLLATERAL PSYCHOTHERAPEUTIC INTERVENTION:  patient not interested in additional psychotherapy.    RISK:  Moderate.     Assessment/Plan     Diagnoses and all orders for this visit:    1. Dysthymic disorder (Primary)  -     ALPRAZolam XR (XANAX XR) 0.5 MG 24 hr tablet; TAKE 1 TABLET BY MOUTH EVERY MORNING AND 2 TABLETS BY MOUTH EVERY EVENING FOR ANXIETY  Dispense: 90 tablet; Refill: 5    2. Medication management  -     [facility] Drug Screen        Return in about 6 months (around 12/10/2021).           Patient knows to call if symptoms worsen or fail to improve between appointments.    I spent 30 minutes caring for Elsa on this date of service. This time includes time spent by me in the following activities: Patient evaluation, support psychotherapy, decisions, medications, and documentation.     Dictated utilizing [employer] dictation    SATURNINO Ibarra MD

## 2021-07-07 ENCOUNTER — TRANSCRIBE ORDERS (OUTPATIENT)
Dept: LAB | Facility: HOSPITAL | Age: 66
End: 2021-07-07

## 2021-07-07 ENCOUNTER — HOSPITAL ENCOUNTER (OUTPATIENT)
Dept: ULTRASOUND IMAGING | Facility: HOSPITAL | Age: 66
Discharge: HOME | End: 2021-07-07

## 2021-07-07 DIAGNOSIS — N18.9 CHRONIC KIDNEY DISEASE, UNSPECIFIED CKD STAGE: Primary | ICD-10-CM

## 2021-07-07 DIAGNOSIS — R10.11 RUQ PAIN: Primary | ICD-10-CM

## 2021-07-07 DIAGNOSIS — N18.9 CHRONIC KIDNEY DISEASE, UNSPECIFIED CKD STAGE: ICD-10-CM

## 2021-07-07 DIAGNOSIS — R10.11 RUQ PAIN: ICD-10-CM

## 2021-07-07 PROCEDURE — 76705 ECHO EXAM OF ABDOMEN: CPT | Performed by: RADIOLOGY

## 2021-07-07 PROCEDURE — 76775 US EXAM ABDO BACK WALL LIM: CPT | Performed by: RADIOLOGY

## 2021-07-07 PROCEDURE — 76705 ECHO EXAM OF ABDOMEN: CPT

## 2021-07-07 PROCEDURE — 76775 US EXAM ABDO BACK WALL LIM: CPT

## 2021-08-20 DIAGNOSIS — F34.1 DYSTHYMIC DISORDER: ICD-10-CM

## 2021-08-23 RX ORDER — QUETIAPINE FUMARATE 50 MG/1
TABLET, FILM COATED ORAL
Qty: 180 TABLET | Refills: 0 | Status: SHIPPED | OUTPATIENT
Start: 2021-08-23 | End: 2021-12-13

## 2021-09-01 ENCOUNTER — TRANSCRIBE ORDERS (OUTPATIENT)
Dept: LAB | Facility: HOSPITAL | Age: 66
End: 2021-09-01

## 2021-09-01 DIAGNOSIS — R10.11 ABDOMINAL PAIN, RIGHT UPPER QUADRANT: Primary | ICD-10-CM

## 2021-09-02 ENCOUNTER — HOSPITAL ENCOUNTER (OUTPATIENT)
Dept: ULTRASOUND IMAGING | Facility: HOSPITAL | Age: 66
Discharge: HOME | End: 2021-09-02
Admitting: FAMILY MEDICINE

## 2021-09-02 DIAGNOSIS — R10.11 ABDOMINAL PAIN, RIGHT UPPER QUADRANT: ICD-10-CM

## 2021-09-02 PROCEDURE — 76700 US EXAM ABDOM COMPLETE: CPT | Performed by: RADIOLOGY

## 2021-09-02 PROCEDURE — 76700 US EXAM ABDOM COMPLETE: CPT

## 2021-09-24 DIAGNOSIS — F34.1 DYSTHYMIC DISORDER: ICD-10-CM

## 2021-09-25 RX ORDER — QUETIAPINE FUMARATE 50 MG/1
TABLET, FILM COATED ORAL
Qty: 180 TABLET | Refills: 0 | OUTPATIENT
Start: 2021-09-25

## 2021-09-25 RX ORDER — CITALOPRAM 20 MG/1
TABLET ORAL
Qty: 30 TABLET | Refills: 1 | OUTPATIENT
Start: 2021-09-25

## 2021-09-25 NOTE — TELEPHONE ENCOUNTER
Patient had 3 month Rx for the Quetiapine 8/23    Last Rx for the Celexa was January for 2 months - patient must not be taking?

## 2021-09-27 NOTE — TELEPHONE ENCOUNTER
The chart shows that Seroquel was filled on 8- with no refills. The Celexa has not been filled since January. I will call patient and verify if she is taking Celexa.

## 2021-09-28 ENCOUNTER — TELEPHONE (OUTPATIENT)
Dept: PSYCHIATRY | Facility: CLINIC | Age: 66
End: 2021-09-28

## 2021-09-28 NOTE — TELEPHONE ENCOUNTER
Spoke with patient and she states that the pharmacy told her that her refill was 55 days early on her Xanax.   She states she takes the Xanax, Seroquel, and Celexa daily and that she will be out soon.

## 2021-09-28 NOTE — TELEPHONE ENCOUNTER
Call the patient but she was out, talked with her  about the meds. Records indicate she should have Xanax to do till December, Seroquel to do till November and there has not been a Rx for Celexa since last December for six months.   Patient next appointment 12/13.

## 2021-09-30 DIAGNOSIS — F34.1 DYSTHYMIC DISORDER: ICD-10-CM

## 2021-09-30 RX ORDER — CITALOPRAM 20 MG/1
20 TABLET ORAL NIGHTLY
Qty: 90 TABLET | Refills: 0 | Status: SHIPPED | OUTPATIENT
Start: 2021-09-30 | End: 2021-12-13

## 2021-11-23 ENCOUNTER — APPOINTMENT (OUTPATIENT)
Dept: MAMMOGRAPHY | Facility: HOSPITAL | Age: 66
End: 2021-11-23

## 2021-12-02 NOTE — PROGRESS NOTES
"[facility]      Identification: Elsa Porras is a 66 y.o. female who resides in [place], [place]    Reason for visit:  · Dyspnea with exertion  · CV risk factors      Subjective      Elsa Porras presents to [facility] for followup.    Elsa continues to complain of shortness of breath with exertion.  She has had a echocardiogram performed recently showing normal LV systolic function and some diastolic dysfunction.  However she has had multiple proBNP's performed over the last year or so which have been within normal limits.            Review of Systems   Constitutional: Negative for malaise/fatigue.   Eyes: Negative for vision loss in left eye and vision loss in right eye.   Cardiovascular: Positive for chest pain, dyspnea on exertion and irregular heartbeat. Negative for near-syncope, orthopnea, palpitations, paroxysmal nocturnal dyspnea and syncope.   Musculoskeletal: Negative for myalgias.   Neurological: Negative for brief paralysis, excessive daytime sleepiness, focal weakness, numbness, paresthesias and weakness.   All other systems reviewed and are negative.      Objective     /72 (BP Location: Right arm, Patient Position: Sitting)   Pulse 87   Ht 160 cm (63\")   Wt 74.3 kg (163 lb 12.8 oz)   SpO2 95%   BMI 29.02 kg/m²       Constitutional:       Appearance: Healthy appearance. Well-developed.   Eyes:      General: No scleral icterus.  HENT:      Head: Normocephalic and atraumatic.   Neck:      Vascular: No carotid bruit or JVD. JVD normal.   Pulmonary:      Effort: Pulmonary effort is normal.      Breath sounds: Normal breath sounds.   Cardiovascular:      Normal rate. Regular rhythm.      Murmurs: There is no murmur.      No gallop.   Musculoskeletal:      Extremities: No clubbing present.Skin:     General: Skin is warm and dry. There is no cyanosis.   Neurological:      Mental Status: Alert.   Psychiatric:         Attention and Perception: " Attention normal.         Behavior: Behavior normal.         Result Review :    Lab date: 6/23/2021  • CMP: Glu 106, BUN 20, Creat 1.06, eGFR 52, Na 139, K 3.5, Cl 94, CO2 32, Ca 10.1  • CBC: WBC 10.17, RBC 4.38, HGB 14.2, HCT 41.4, MCV 95, MCH 32.4,   • TSH: 0.31      ECG 12 Lead    Date/Time: 12/3/2021 1:14 PM  Performed by: Binh Ga IV, MD  Authorized by: Binh Ga IV, MD   Comparison: compared with previous ECG from 5/19/2020  Similar to previous ECG  Rhythm: sinus rhythm  BPM: 81  Conduction: right bundle branch block             Assessment     Problem List Items Addressed This Visit        Cardiology Problems    Essential hypertension (Chronic)    Overview     • Target blood pressure <130/80 mmHg         Current Assessment & Plan     · Controlled  · Continue present medical therapy         Relevant Medications    losartan (COZAAR) 50 MG tablet    potassium chloride (K-DUR,KLOR-CON) 20 MEQ CR tablet    bumetanide (BUMEX) 1 MG tablet    Hyperlipidemia LDL goal <100 (Chronic)    Overview     · Statin therapy reasonable for primary prevention            Other    Dyspnea on exertion - Primary    Overview     · Echo (6/19/2020): LVEF 70%.  No significant valvular abnormality.  No mitral valve prolapse.  · Pharmacologic nuclear stress (6/17/2020): No ischemia/infarct.  Normal LVEF  · Echo at [facility] (2/24/2021): LVEF 60%.  Diastolic dysfunction.  Mild MR  · GXT (3/24/2021): Moderately reduced exercise capacity.  No ischemia on EKG.  Normal HR/BP response exercise.         Current Assessment & Plan     · Diastolic dysfunction noted on recent echo, but multiple previous proBNP is within normal limits argue against decompensated HFpEF  · Follow-up pulmonary as previously recommended               Plan   • Follow-up with pulmonary as previously recommended  •       Follow-up   Return in about 1 year (around 12/3/2022) for Follow-up with cardiology APRN/KARMA Obregon  MD Harmeet, [facility], [facility]  12/3/2021   O-Z Flap Text: The defect edges were debeveled with a #15 scalpel blade.  Given the location of the defect, shape of the defect and the proximity to free margins an O-Z flap was deemed most appropriate.  Using a sterile surgical marker, an appropriate transposition flap was drawn incorporating the defect and placing the expected incisions within the relaxed skin tension lines where possible. The area thus outlined was incised deep to adipose tissue with a #15 scalpel blade.  The skin margins were undermined to an appropriate distance in all directions utilizing iris scissors.

## 2021-12-03 ENCOUNTER — OFFICE VISIT (OUTPATIENT)
Dept: CARDIOLOGY | Facility: CLINIC | Age: 66
End: 2021-12-03

## 2021-12-03 VITALS
BODY MASS INDEX: 29.02 KG/M2 | OXYGEN SATURATION: 95 % | SYSTOLIC BLOOD PRESSURE: 110 MMHG | WEIGHT: 163.8 LBS | DIASTOLIC BLOOD PRESSURE: 72 MMHG | HEART RATE: 87 BPM | HEIGHT: 63 IN

## 2021-12-03 DIAGNOSIS — R06.09 DYSPNEA ON EXERTION: Primary | ICD-10-CM

## 2021-12-03 DIAGNOSIS — E78.5 HYPERLIPIDEMIA LDL GOAL <100: Chronic | ICD-10-CM

## 2021-12-03 DIAGNOSIS — I10 ESSENTIAL HYPERTENSION: Chronic | ICD-10-CM

## 2021-12-03 PROCEDURE — 93000 ELECTROCARDIOGRAM COMPLETE: CPT | Performed by: INTERNAL MEDICINE

## 2021-12-03 PROCEDURE — 99213 OFFICE O/P EST LOW 20 MIN: CPT | Performed by: INTERNAL MEDICINE

## 2021-12-03 RX ORDER — MOMETASONE FUROATE 1 MG/G
CREAM TOPICAL AS NEEDED
Status: ON HOLD | COMMUNITY
Start: 2021-10-21 | End: 2022-08-11

## 2021-12-03 RX ORDER — POTASSIUM CHLORIDE 20 MEQ/1
20 TABLET, EXTENDED RELEASE ORAL DAILY
Qty: 90 TABLET | Refills: 3
Start: 2021-12-03 | End: 2021-12-03 | Stop reason: SDUPTHER

## 2021-12-03 RX ORDER — BUMETANIDE 1 MG/1
1 TABLET ORAL DAILY
Status: ON HOLD
Start: 2021-12-03 | End: 2022-08-11 | Stop reason: SDUPTHER

## 2021-12-03 RX ORDER — LOSARTAN POTASSIUM 50 MG/1
50 TABLET ORAL DAILY
Qty: 90 TABLET | Refills: 3
Start: 2021-12-03 | End: 2022-12-21 | Stop reason: SDUPTHER

## 2021-12-03 RX ORDER — POTASSIUM CHLORIDE 20 MEQ/1
20 TABLET, EXTENDED RELEASE ORAL DAILY
Qty: 90 TABLET | Refills: 3
Start: 2021-12-03

## 2021-12-03 RX ORDER — POTASSIUM CHLORIDE 20 MEQ/1
TABLET, EXTENDED RELEASE ORAL
COMMUNITY
Start: 2021-11-19 | End: 2021-12-03 | Stop reason: SDUPTHER

## 2021-12-03 RX ORDER — BUMETANIDE 1 MG/1
1 TABLET ORAL DAILY
Start: 2021-12-03 | End: 2021-12-03 | Stop reason: SDUPTHER

## 2021-12-03 RX ORDER — PROMETHAZINE HYDROCHLORIDE 25 MG/1
TABLET ORAL 2 TIMES DAILY PRN
COMMUNITY
Start: 2021-08-31

## 2021-12-03 NOTE — ASSESSMENT & PLAN NOTE
· Diastolic dysfunction noted on recent echo, but multiple previous proBNP is within normal limits argue against decompensated HFpEF  · Follow-up pulmonary as previously recommended

## 2021-12-13 ENCOUNTER — OFFICE VISIT (OUTPATIENT)
Dept: PSYCHIATRY | Facility: CLINIC | Age: 66
End: 2021-12-13

## 2021-12-13 VITALS
BODY MASS INDEX: 29.41 KG/M2 | HEART RATE: 74 BPM | DIASTOLIC BLOOD PRESSURE: 60 MMHG | SYSTOLIC BLOOD PRESSURE: 101 MMHG | WEIGHT: 166 LBS

## 2021-12-13 DIAGNOSIS — F34.1 DYSTHYMIC DISORDER: Primary | ICD-10-CM

## 2021-12-13 PROCEDURE — 99214 OFFICE O/P EST MOD 30 MIN: CPT | Performed by: PSYCHIATRY & NEUROLOGY

## 2021-12-13 RX ORDER — ALPRAZOLAM 1 MG/1
TABLET, EXTENDED RELEASE ORAL
Qty: 60 TABLET | Refills: 2 | Status: SHIPPED | OUTPATIENT
Start: 2021-12-13 | End: 2022-01-11

## 2021-12-13 RX ORDER — ALPRAZOLAM 1 MG/1
1 TABLET, EXTENDED RELEASE ORAL EVERY MORNING
Qty: 60 TABLET | Refills: 2 | Status: SHIPPED | OUTPATIENT
Start: 2021-12-13 | End: 2021-12-13 | Stop reason: SDUPTHER

## 2021-12-13 RX ORDER — QUETIAPINE FUMARATE 100 MG/1
100 TABLET, FILM COATED ORAL NIGHTLY
Qty: 30 TABLET | Refills: 2 | Status: SHIPPED | OUTPATIENT
Start: 2021-12-13 | End: 2022-03-03 | Stop reason: SDUPTHER

## 2021-12-13 RX ORDER — MIRTAZAPINE 15 MG/1
15 TABLET, FILM COATED ORAL NIGHTLY
Qty: 30 TABLET | Refills: 2 | Status: SHIPPED | OUTPATIENT
Start: 2021-12-13 | End: 2022-03-03

## 2021-12-13 NOTE — PROGRESS NOTES
"Patient ID: Elsa Porras is a 66 y.o. female    SERVICE TYPE: EVALUATION AND MANAGEMENT (greater than 50% of the time spent for supportive psychotherapy).      /60   Pulse 74   Wt 75.3 kg (166 lb)   BMI 29.41 kg/m²     ALLERGIES:  Codeine, Isosorbide nitrate, Levaquin [levofloxacin], Lorcet [hydrocodone-acetaminophen], Other, Percocet [oxycodone-acetaminophen], and Ultram [tramadol]    CC/ Focus of the visit: depression/ anxiety    HPI: \"Not been doing well, tired, weak, if get up feel dizzy, migraines involving right side\". Depression \"just feel down\". Depending on spouse to do the housework and cook. Initial insomnia. Diminished interest aside for the 6 y/o GS.  Patient makes case for being more anxious pain particularly somewhat incapacitated by her physical/medical issues.  Discussed changing her medications from the Celexa to mirtazapine as well slightly increasing the alprazolam dose.    No indication of prescription misuse or substance abuse.    PFSH:  Stable with her  and a 5-year-old grandson.    Review of Systems  No cardiopulmonary or GI complaints. No urological complaints.     SUPPORTIVE PSYCHOTHERAPY: continuing efforts to promote the therapeutic alliance, address the patient’s issues, and strengthen self awareness, insights, and positive coping skills such as Exercising, listen to music, spending time in nature and utilizing resources.     Mental Status Exam  Appearance:  appropriate  Attitude toward clinician:  cooperative and agreeable   Speech:    Rate:  regular rate and rhythm   Volume: normal  Motor:  no abnormal movements   Mood:  Good  Affect:  euthymic  Thought Processes:  linear, logical, and goal directed  Thought Content:  Normal   Feeling Hopeless: absent  Suicidal Thoughts or Intent:  absent  Homicidal Thoughts:  absent  Perceptual Disturbance: no perceptual disturbance  Attention and Concentration:  good  Insight and Judgement:  good  Memory:  memory appears to " be intact    LABS: No results found for this or any previous visit (from the past 168 hour(s)).    MEDICATION ISSUES: Have discussed with the patient the medications Risks, Benefits, and Side effects including potential falls, possible impaired driving and  metabolic adversities among others. No medication side effects or related complaints today.     TREATMENT PLAN/GOALS: Continue supportive psychotherapy efforts and medications as indicated.     Current Outpatient Medications   Medication Sig Dispense Refill   • bumetanide (BUMEX) 1 MG tablet Take 1 tablet by mouth Daily.     • Cholecalciferol (VITAMIN D PO) Take 50,000 Units by mouth Every 30 (Thirty) Days.     • fexofenadine (ALLEGRA) 180 MG tablet Take 180 mg by mouth Daily.     • levothyroxine (SYNTHROID, LEVOTHROID) 50 MCG tablet Take 75 mcg by mouth Daily.     • losartan (COZAAR) 50 MG tablet Take 1 tablet by mouth Daily. 90 tablet 3   • mometasone (ELOCON) 0.1 % cream As Needed.     • montelukast (SINGULAIR) 10 MG tablet TAKE 1 TABLET BY MOUTH ONCE A DAY AT BEDTIME AS DIRECTED BY YOUR PRESCRIBER TO PREVENT ASTHMA ATTACKS OR ALLERGIES.  0   • nitroglycerin (NITROSTAT) 0.4 MG SL tablet Place 1 tablet under the tongue Every 5 (Five) Minutes As Needed for Chest Pain. 25 tablet 5   • polyethylene glycol (MIRALAX) powder PLACE 17 GRAMS IN JUICE OR WATER THEN STIR AND DRINK ONCE A DAY FOR CONSTIPATION  2   • potassium chloride (K-DUR,KLOR-CON) 20 MEQ CR tablet Take 1 tablet by mouth Daily. 90 tablet 3   • promethazine (PHENERGAN) 25 MG tablet TAKE ONE TABLET BY MOUTH TWO TIMES A DAY FOR NAUSEA AND VOMITING     • ALPRAZolam XR (Xanax XR) 1 MG 24 hr tablet Take on bid. 60 tablet 2   • mirtazapine (Remeron) 15 MG tablet Take 1 tablet by mouth Every Night. 30 tablet 2   • mupirocin (BACTROBAN) 2 % ointment APPLY TO SURGICAL ON RIGHT LEG SITE ONCE A DAY AS DIRECTED     • QUEtiapine (SEROquel) 100 MG tablet Take 1 tablet by mouth Every Night. 30 tablet 2     No current  facility-administered medications for this visit.       COLLATERAL PSYCHOTHERAPEUTIC INTERVENTION:  patient not interested in additional psychotherapy.    RISK: Moderate    Assessment/Plan     Diagnoses and all orders for this visit:    1. Dysthymic disorder (Primary)  -     mirtazapine (Remeron) 15 MG tablet; Take 1 tablet by mouth Every Night.  Dispense: 30 tablet; Refill: 2  -     Discontinue: ALPRAZolam XR (Xanax XR) 1 MG 24 hr tablet; Take 1 tablet by mouth Every Morning.  Dispense: 60 tablet; Refill: 2  -     QUEtiapine (SEROquel) 100 MG tablet; Take 1 tablet by mouth Every Night.  Dispense: 30 tablet; Refill: 2  -     ALPRAZolam XR (Xanax XR) 1 MG 24 hr tablet; Take on bid.  Dispense: 60 tablet; Refill: 2 ( Correct the sig and canceled the prior Rx)         Return in about 3 months (around 3/13/2022).           Patient knows to call if symptoms worsen or fail to improve between appointments.    I spent 30 minutes caring for Elsa on this date of service. This time includes time spent by me in the following activities: Patient evaluation, support psychotherapy, decisions, medications, and documentation.     Dictated utilizing [employer] dictation    SATURNINO Ibarra MD

## 2021-12-15 ENCOUNTER — TELEPHONE (OUTPATIENT)
Dept: PSYCHIATRY | Facility: CLINIC | Age: 66
End: 2021-12-15

## 2021-12-15 NOTE — TELEPHONE ENCOUNTER
She called and said does she take xanax in morning and bed time ? The bottle says 1 a day but she did get 60 not 30 so she just wants to make sure it is twice a day

## 2021-12-27 ENCOUNTER — TELEPHONE (OUTPATIENT)
Dept: PSYCHIATRY | Facility: CLINIC | Age: 66
End: 2021-12-27

## 2021-12-27 NOTE — TELEPHONE ENCOUNTER
Patient saw Dr. Ibarra couple weeks ago and he changed her Celexa to Remron.  Patient is having severe nausea and bloating and would like to be changed back?  Dr. HERNANDEZ is off this week, can you help?

## 2021-12-28 RX ORDER — CITALOPRAM 20 MG/1
20 TABLET ORAL DAILY
Qty: 30 TABLET | Refills: 2 | Status: SHIPPED | OUTPATIENT
Start: 2021-12-28 | End: 2022-03-03 | Stop reason: SDUPTHER

## 2022-01-05 ENCOUNTER — TRANSCRIBE ORDERS (OUTPATIENT)
Dept: LAB | Facility: HOSPITAL | Age: 67
End: 2022-01-05

## 2022-01-05 ENCOUNTER — HOSPITAL ENCOUNTER (OUTPATIENT)
Dept: GENERAL RADIOLOGY | Facility: HOSPITAL | Age: 67
Discharge: HOME | End: 2022-01-05

## 2022-01-05 DIAGNOSIS — M54.2 CERVICALGIA: ICD-10-CM

## 2022-01-05 DIAGNOSIS — M25.511 RIGHT SHOULDER PAIN, UNSPECIFIED CHRONICITY: ICD-10-CM

## 2022-01-05 DIAGNOSIS — M54.2 CERVICALGIA: Primary | ICD-10-CM

## 2022-01-05 PROCEDURE — 73030 X-RAY EXAM OF SHOULDER: CPT

## 2022-01-05 PROCEDURE — 72050 X-RAY EXAM NECK SPINE 4/5VWS: CPT

## 2022-01-05 PROCEDURE — 72050 X-RAY EXAM NECK SPINE 4/5VWS: CPT | Performed by: RADIOLOGY

## 2022-01-05 PROCEDURE — 73030 X-RAY EXAM OF SHOULDER: CPT | Performed by: RADIOLOGY

## 2022-01-11 DIAGNOSIS — F34.1 DYSTHYMIC DISORDER: ICD-10-CM

## 2022-01-11 RX ORDER — ALPRAZOLAM 1 MG/1
TABLET, EXTENDED RELEASE ORAL
Qty: 60 TABLET | Refills: 1 | Status: SHIPPED | OUTPATIENT
Start: 2022-01-11 | End: 2022-03-03 | Stop reason: SDUPTHER

## 2022-01-11 NOTE — TELEPHONE ENCOUNTER
Patient told the pharmacy this script was suppose to be twice a day. The pharmacy will need an updated script if this is accurate.

## 2022-01-14 ENCOUNTER — APPOINTMENT (OUTPATIENT)
Dept: MAMMOGRAPHY | Facility: HOSPITAL | Age: 67
End: 2022-01-14

## 2022-01-25 ENCOUNTER — TELEPHONE (OUTPATIENT)
Dept: PSYCHIATRY | Facility: CLINIC | Age: 67
End: 2022-01-25

## 2022-01-28 ENCOUNTER — TELEPHONE (OUTPATIENT)
Dept: CARDIOLOGY | Facility: CLINIC | Age: 67
End: 2022-01-28

## 2022-01-28 DIAGNOSIS — E78.5 HYPERLIPIDEMIA LDL GOAL <100: Primary | ICD-10-CM

## 2022-01-28 NOTE — TELEPHONE ENCOUNTER
----- Message from Binh Ga IV, MD sent at 1/28/2022  4:10 PM EST -----  Repeat lipid profile with direct LDL at [facility]

## 2022-02-18 ENCOUNTER — TELEPHONE (OUTPATIENT)
Dept: CARDIOLOGY | Facility: CLINIC | Age: 67
End: 2022-02-18

## 2022-02-18 DIAGNOSIS — E78.5 HYPERLIPIDEMIA LDL GOAL <100: Primary | ICD-10-CM

## 2022-02-18 NOTE — TELEPHONE ENCOUNTER
----- Message from Binh Ga IV, MD sent at 2/18/2022  1:03 PM EST -----  Get a direct LDL at [facility].

## 2022-02-28 ENCOUNTER — LAB (OUTPATIENT)
Dept: LAB | Facility: HOSPITAL | Age: 67
End: 2022-02-28

## 2022-02-28 DIAGNOSIS — E78.5 HYPERLIPIDEMIA LDL GOAL <100: ICD-10-CM

## 2022-02-28 PROCEDURE — 83721 ASSAY OF BLOOD LIPOPROTEIN: CPT

## 2022-03-01 LAB — ARTICHOKE IGE QN: 49 MG/DL (ref 0–100)

## 2022-03-03 ENCOUNTER — OFFICE VISIT (OUTPATIENT)
Dept: PSYCHIATRY | Facility: CLINIC | Age: 67
End: 2022-03-03

## 2022-03-03 VITALS
SYSTOLIC BLOOD PRESSURE: 102 MMHG | HEIGHT: 63 IN | WEIGHT: 168.2 LBS | DIASTOLIC BLOOD PRESSURE: 58 MMHG | HEART RATE: 75 BPM | BODY MASS INDEX: 29.8 KG/M2

## 2022-03-03 DIAGNOSIS — F34.1 DYSTHYMIC DISORDER: ICD-10-CM

## 2022-03-03 DIAGNOSIS — Z79.899 MEDICATION MANAGEMENT: Primary | ICD-10-CM

## 2022-03-03 PROCEDURE — 99214 OFFICE O/P EST MOD 30 MIN: CPT | Performed by: PSYCHIATRY & NEUROLOGY

## 2022-03-03 RX ORDER — EZETIMIBE 10 MG/1
TABLET ORAL
Status: ON HOLD | COMMUNITY
Start: 2022-02-15 | End: 2022-08-11

## 2022-03-03 RX ORDER — ALPRAZOLAM 1 MG/1
TABLET, EXTENDED RELEASE ORAL
Qty: 180 TABLET | Refills: 1 | Status: SHIPPED | OUTPATIENT
Start: 2022-03-03 | End: 2022-08-16 | Stop reason: SDUPTHER

## 2022-03-03 RX ORDER — CITALOPRAM 20 MG/1
20 TABLET ORAL DAILY
Qty: 90 TABLET | Refills: 1 | Status: SHIPPED | OUTPATIENT
Start: 2022-03-03 | End: 2022-08-16

## 2022-03-03 RX ORDER — QUETIAPINE FUMARATE 100 MG/1
100 TABLET, FILM COATED ORAL NIGHTLY
Qty: 90 TABLET | Refills: 1 | Status: SHIPPED | OUTPATIENT
Start: 2022-03-03 | End: 2022-08-16 | Stop reason: SDUPTHER

## 2022-03-03 RX ORDER — METOLAZONE 5 MG/1
TABLET ORAL
Status: ON HOLD | COMMUNITY
Start: 2022-02-15 | End: 2022-08-11

## 2022-03-03 NOTE — PROGRESS NOTES
"Patient ID: Elsa Porras is a 67 y.o. female    SERVICE TYPE: EVALUATION AND MANAGEMENT (greater than 50% of the time spent for supportive psychotherapy).      /58   Pulse 75   Ht 160 cm (62.99\")   Wt 76.3 kg (168 lb 3.2 oz)   BMI 29.80 kg/m²     ALLERGIES:  Codeine, Isosorbide nitrate, Levaquin [levofloxacin], Lorcet [hydrocodone-acetaminophen], Other, Percocet [oxycodone-acetaminophen], Remeron [mirtazapine], Septra [sulfamethoxazole-trimethoprim], and Ultram [tramadol]    CC/ Focus of the visit: Depression/anxiety    HPI: Patient reports that she is doing fairly well emotionally despite numerous physical problems, has had an throat, has advanced osteoarthritis both knees, is obviously short of breath and came to the clinic in a wheelchair due to the knee issue.  Her affect was appropriate and she has no major complaints as relates to depression Friday.  Increased activity limited physical difficult.  Patient seen with her .     Once again there is no indication of prescription misuse.or substance abuse.      PFSH: Stable  and supportive and helping patient with her physical problems.    Review of Systems  No cardiopulmonary, GI or neurological complaints.    SUPPORTIVE PSYCHOTHERAPY: continuing efforts to promote the therapeutic alliance, address the patient’s issues, and strengthen self awareness, insights, and positive coping skills such as Exercising, listen to music, spending time in nature and utilizing resources.     Mental Status Exam  Appearance:  appropriate  Attitude toward clinician:  cooperative and agreeable   Speech:    Rate:  regular rate and rhythm   Volume: normal  Motor:  no abnormal movements   Mood:  Good  Affect:  euthymic  Thought Processes:  linear, logical, and goal directed  Thought Content:  Normal   Feeling Hopeless: absent  Suicidal Thoughts or Intent:  absent  Homicidal Thoughts:  absent  Perceptual Disturbance: no perceptual disturbance  Attention " and Concentration:  good  Insight and Judgement:  good  Memory:  memory appears to be intact    LABS:   Recent Results (from the past 168 hour(s))   LDL Cholesterol, Direct    Collection Time: 02/28/22 11:47 AM    Specimen: Blood   Result Value Ref Range    LDL Cholesterol  49 0 - 100 mg/dL   [facility] Drug Screen    Collection Time: 03/03/22  1:39 PM    Specimen: Urine, Clean Catch   Result Value Ref Range    External Amphetamine Screen Urine Negative     Amphetamine Cut-Off 1000ng/ml     External Benzodiazepine Screen Urine Positive     Benzodiazipine Cut-Off 300ng/ml     External Cocaine Screen Urine Negative     Cocaine Cut-Off 300ng/ml     External THC Screen Urine Negative     THC Cut-Off 50ng/ml     External Methadone Screen Urine Negative     Methadone Cut-Off 300ng/ml     External Methamphetamine Screen Urine Negative     Methamphetamine Cut-Off 1000ng/ml     External Oxycodone Screen Urine Negative     Oxycodone Cut-Off 100ng/ml     External Buprenorphine Screen Urine Negative     Buprenorphine Cut-Off 10ng/ml     External MDMA Negative     MDMA Cut-Off 500ng/ml     External Opiates Screen Urine Negative     Opiates Cut-Off 300ng/ml        MEDICATION ISSUES: Have discussed with the patient the medications Risks, Benefits, and Side effects including potential falls, possible impaired driving and  metabolic adversities among others. No medication side effects or related complaints today.     TREATMENT PLAN/GOALS: Continue supportive psychotherapy efforts and medications as indicated.     Current Outpatient Medications   Medication Sig Dispense Refill   • ALPRAZolam XR (XANAX XR) 1 MG 24 hr tablet TAKE 1 TABLET BY MOUTH twice a  tablet 1   • bumetanide (BUMEX) 1 MG tablet Take 1 tablet by mouth Daily.     • Cholecalciferol (VITAMIN D PO) Take 50,000 Units by mouth Every 30 (Thirty) Days.     • citalopram (CeleXA) 20 MG tablet Take 1 tablet by mouth Daily. 90 tablet 1   • ezetimibe (ZETIA) 10 MG tablet       • fexofenadine (ALLEGRA) 180 MG tablet Take 180 mg by mouth Daily.     • levothyroxine (SYNTHROID, LEVOTHROID) 50 MCG tablet Take 75 mcg by mouth Daily.     • losartan (COZAAR) 50 MG tablet Take 1 tablet by mouth Daily. 90 tablet 3   • metOLazone (ZAROXOLYN) 5 MG tablet      • montelukast (SINGULAIR) 10 MG tablet TAKE 1 TABLET BY MOUTH ONCE A DAY AT BEDTIME AS DIRECTED BY YOUR PRESCRIBER TO PREVENT ASTHMA ATTACKS OR ALLERGIES.  0   • nitroglycerin (NITROSTAT) 0.4 MG SL tablet Place 1 tablet under the tongue Every 5 (Five) Minutes As Needed for Chest Pain. 25 tablet 5   • polyethylene glycol (MIRALAX) powder PLACE 17 GRAMS IN JUICE OR WATER THEN STIR AND DRINK ONCE A DAY FOR CONSTIPATION  2   • potassium chloride (K-DUR,KLOR-CON) 20 MEQ CR tablet Take 1 tablet by mouth Daily. 90 tablet 3   • promethazine (PHENERGAN) 25 MG tablet TAKE ONE TABLET BY MOUTH TWO TIMES A DAY FOR NAUSEA AND VOMITING     • QUEtiapine (SEROquel) 100 MG tablet Take 1 tablet by mouth Every Night. 90 tablet 1   • mometasone (ELOCON) 0.1 % cream As Needed.     • mupirocin (BACTROBAN) 2 % ointment APPLY TO SURGICAL ON RIGHT LEG SITE ONCE A DAY AS DIRECTED       No current facility-administered medications for this visit.       COLLATERAL PSYCHOTHERAPEUTIC INTERVENTION:  patient not interested in additional psychotherapy.    RISK: Moderate    Assessment/Plan     Diagnoses and all orders for this visit:    1. Medication management (Primary)  -     [facility] Screen    2. Dysthymic disorder  -     ALPRAZolam XR (XANAX XR) 1 MG 24 hr tablet; TAKE 1 TABLET BY MOUTH twice a DAY  Dispense: 180 tablet; Refill: 1  -     citalopram (CeleXA) 20 MG tablet; Take 1 tablet by mouth Daily.  Dispense: 90 tablet; Refill: 1  -     QUEtiapine (SEROquel) 100 MG tablet; Take 1 tablet by mouth Every Night.  Dispense: 90 tablet; Refill: 1        Return in about 6 months (around 9/3/2022).       Patient knows to call if symptoms worsen or fail to improve between  appointments.    I spent 30 minutes caring for Elsa on this date of service. This time includes time spent by me in the following activities: Patient evaluation, support psychotherapy, decisions, medications, and documentation.     Dictated utilizing [employer] dictation    SATURNINO Ibarra MD

## 2022-03-09 ENCOUNTER — TELEPHONE (OUTPATIENT)
Dept: CARDIOLOGY | Facility: CLINIC | Age: 67
End: 2022-03-09

## 2022-03-09 NOTE — TELEPHONE ENCOUNTER
Spoke with patient about lab results. She verbalized understanding. Told to call if she had any additional questions.

## 2022-03-18 ENCOUNTER — TELEPHONE (OUTPATIENT)
Dept: CARDIOLOGY | Facility: CLINIC | Age: 67
End: 2022-03-18

## 2022-03-18 DIAGNOSIS — R00.2 PALPITATIONS: Primary | ICD-10-CM

## 2022-03-18 NOTE — TELEPHONE ENCOUNTER
I suspect her issues are anxiety related.  She can try metoprolol tartrate 25 mg twice daily as needed palpitations if she wants to see if this helps her symptoms.

## 2022-03-18 NOTE — TELEPHONE ENCOUNTER
Patient called to report that she has been having palpitations every day that start at 1530 and last until she goes to bed. She is also so fatigued and weak all the time. BP running 110/53, HR 73. She then reports it has been fluctuating and elevated at 171/73, HR 70's.     She denies drinking any caffeine or any use of cold medicine.     14 day monitor 1/28/2021- short lived SVT.   (1/6/22): TSH  1.5    She takes:  Losartan 50 mg daily  Bumex 1 mg daily  Metolazone 5 mg daily    She would like to wear another monitor.  Anything else?

## 2022-03-18 NOTE — TELEPHONE ENCOUNTER
She agrees that this could be anxiety related because yesterday she took a 81 mg aspirin and her symptoms resolved. She will hold off on the monitor. If her symptoms do not improve with metoprolol she will call and we will order monitor.

## 2022-06-07 ENCOUNTER — HOSPITAL ENCOUNTER (OUTPATIENT)
Dept: MAMMOGRAPHY | Facility: HOSPITAL | Age: 67
Discharge: HOME | End: 2022-06-07
Admitting: FAMILY MEDICINE

## 2022-06-07 ENCOUNTER — PRIOR AUTHORIZATION (OUTPATIENT)
Dept: PSYCHIATRY | Facility: CLINIC | Age: 67
End: 2022-06-07

## 2022-06-07 DIAGNOSIS — Z12.31 VISIT FOR SCREENING MAMMOGRAM: ICD-10-CM

## 2022-06-07 PROCEDURE — 77063 BREAST TOMOSYNTHESIS BI: CPT

## 2022-06-07 PROCEDURE — 77067 SCR MAMMO BI INCL CAD: CPT

## 2022-06-07 PROCEDURE — 77067 SCR MAMMO BI INCL CAD: CPT | Performed by: RADIOLOGY

## 2022-06-07 PROCEDURE — 77063 BREAST TOMOSYNTHESIS BI: CPT | Performed by: RADIOLOGY

## 2022-06-07 NOTE — TELEPHONE ENCOUNTER
Drug  ALPRAZolam ER 1MG er tablets.    Key: BARYESTEFANIA BEAR Case ID: [id number] - Rx #: [id number]

## 2022-06-21 ENCOUNTER — TELEPHONE (OUTPATIENT)
Dept: CARDIOLOGY | Facility: CLINIC | Age: 67
End: 2022-06-21

## 2022-06-21 NOTE — TELEPHONE ENCOUNTER
[facility] is requesting cardiac clearance for an upcoming total knee replacement with Dr. Jasper Howell. OK to clear?    Last GXT 3/24/2021- Normal with the exception of reduced exercise capacity.

## 2022-07-25 ENCOUNTER — TELEPHONE (OUTPATIENT)
Dept: PSYCHIATRY | Facility: CLINIC | Age: 67
End: 2022-07-25

## 2022-07-25 NOTE — TELEPHONE ENCOUNTER
Patient called and stated that she had knee surgery and a heart attack, and she was not going good. Been in the hospital for knee replacement and having a really hard time with anxiety and wants to know if you can add something to help with her anxiety.      Attempted to call the patient - had to leave message for her to call back PRN.

## 2022-07-26 NOTE — TELEPHONE ENCOUNTER
Patient left a message on medline saying she had missed a call back from Dr Ibarra. Patient said she is doing better now and thinks she will be okay until her next appointment and she appreciates that Dr Ibarra did call her back.

## 2022-07-29 ENCOUNTER — TELEPHONE (OUTPATIENT)
Dept: CARDIOLOGY | Facility: CLINIC | Age: 67
End: 2022-07-29

## 2022-07-29 DIAGNOSIS — R94.39 ABNORMAL NUCLEAR STRESS TEST: ICD-10-CM

## 2022-07-29 DIAGNOSIS — R07.9 CHEST PAIN SYNDROME: Primary | ICD-10-CM

## 2022-07-29 NOTE — TELEPHONE ENCOUNTER
Records received and reviewed by [facility]. If still having symptoms, can proceed with [facility]. I spoke with the patient and she said she is having shortness of breath but would like to hold off on the LHC for now since she is still recovering from surgery. She will call if she would like to proceed.

## 2022-07-29 NOTE — TELEPHONE ENCOUNTER
"Patient called to report that she was having shortness of breath and chest pain about a week after her knee surgery. She went to [facility] and was admitted. She says she had a, \"light heart attack\". They did a stress and echo. She reports that the stress test was abnormal but no LHC was performed. She says she is still having shortness of breath and chest pain but it is some better. Records requested. Once received, will have HTR review.   "

## 2022-08-08 ENCOUNTER — PREP FOR SURGERY (OUTPATIENT)
Dept: OTHER | Facility: HOSPITAL | Age: 67
End: 2022-08-08

## 2022-08-08 DIAGNOSIS — R94.39 ABNORMAL STRESS TEST: Primary | ICD-10-CM

## 2022-08-08 RX ORDER — ASPIRIN 81 MG/1
81 TABLET ORAL DAILY
Status: CANCELLED | OUTPATIENT
Start: 2022-08-09

## 2022-08-08 RX ORDER — SODIUM CHLORIDE 0.9 % (FLUSH) 0.9 %
10 SYRINGE (ML) INJECTION EVERY 12 HOURS SCHEDULED
Status: CANCELLED | OUTPATIENT
Start: 2022-08-08

## 2022-08-08 RX ORDER — ASPIRIN 81 MG/1
324 TABLET, CHEWABLE ORAL ONCE
Status: CANCELLED | OUTPATIENT
Start: 2022-08-08 | End: 2022-08-08

## 2022-08-08 RX ORDER — NITROGLYCERIN 0.4 MG/1
0.4 TABLET SUBLINGUAL
Status: CANCELLED | OUTPATIENT
Start: 2022-08-08

## 2022-08-08 RX ORDER — SODIUM CHLORIDE 0.9 % (FLUSH) 0.9 %
10 SYRINGE (ML) INJECTION AS NEEDED
Status: CANCELLED | OUTPATIENT
Start: 2022-08-08

## 2022-08-10 PROBLEM — R55 PRE-SYNCOPE: Status: RESOLVED | Noted: 2017-04-12 | Resolved: 2022-08-10

## 2022-08-11 ENCOUNTER — HOSPITAL ENCOUNTER (OUTPATIENT)
Facility: HOSPITAL | Age: 67
Setting detail: HOSPITAL OUTPATIENT SURGERY
Discharge: HOME | End: 2022-08-11
Attending: INTERNAL MEDICINE | Admitting: INTERNAL MEDICINE

## 2022-08-11 VITALS
RESPIRATION RATE: 16 BRPM | HEART RATE: 64 BPM | HEIGHT: 63 IN | BODY MASS INDEX: 28.91 KG/M2 | DIASTOLIC BLOOD PRESSURE: 42 MMHG | WEIGHT: 163.14 LBS | TEMPERATURE: 97.6 F | SYSTOLIC BLOOD PRESSURE: 105 MMHG | OXYGEN SATURATION: 96 %

## 2022-08-11 DIAGNOSIS — R00.2 PALPITATIONS: ICD-10-CM

## 2022-08-11 DIAGNOSIS — R07.9 CHEST PAIN SYNDROME: ICD-10-CM

## 2022-08-11 DIAGNOSIS — R94.39 ABNORMAL STRESS TEST: ICD-10-CM

## 2022-08-11 DIAGNOSIS — E78.5 HYPERLIPIDEMIA LDL GOAL <70: ICD-10-CM

## 2022-08-11 DIAGNOSIS — R94.39 ABNORMAL NUCLEAR STRESS TEST: ICD-10-CM

## 2022-08-11 DIAGNOSIS — I25.119 CORONARY ARTERY DISEASE INVOLVING NATIVE CORONARY ARTERY OF NATIVE HEART WITH ANGINA PECTORIS: Primary | ICD-10-CM

## 2022-08-11 DIAGNOSIS — I10 ESSENTIAL HYPERTENSION: Chronic | ICD-10-CM

## 2022-08-11 LAB
ALBUMIN SERPL-MCNC: 4.6 G/DL (ref 3.5–5.2)
ALBUMIN/GLOB SERPL: 2 G/DL
ALP SERPL-CCNC: 106 U/L (ref 39–117)
ALT SERPL W P-5'-P-CCNC: 23 U/L (ref 1–33)
ANION GAP SERPL CALCULATED.3IONS-SCNC: 8 MMOL/L (ref 5–15)
AST SERPL-CCNC: 23 U/L (ref 1–32)
BASOPHILS # BLD AUTO: 0.06 10*3/MM3 (ref 0–0.2)
BASOPHILS NFR BLD AUTO: 0.7 % (ref 0–1.5)
BILIRUB SERPL-MCNC: 0.6 MG/DL (ref 0–1.2)
BUN SERPL-MCNC: 15 MG/DL (ref 8–23)
BUN/CREAT SERPL: 17.9 (ref 7–25)
CALCIUM SPEC-SCNC: 9.7 MG/DL (ref 8.6–10.5)
CHLORIDE SERPL-SCNC: 106 MMOL/L (ref 98–107)
CHOLEST SERPL-MCNC: 155 MG/DL (ref 0–200)
CO2 SERPL-SCNC: 29 MMOL/L (ref 22–29)
CREAT BLDA-MCNC: 0.9 MG/DL (ref 0.6–1.3)
CREAT SERPL-MCNC: 0.84 MG/DL (ref 0.57–1)
DEPRECATED RDW RBC AUTO: 44.9 FL (ref 37–54)
EGFRCR SERPLBLD CKD-EPI 2021: 76.8 ML/MIN/1.73
EOSINOPHIL # BLD AUTO: 0.46 10*3/MM3 (ref 0–0.4)
EOSINOPHIL NFR BLD AUTO: 5.4 % (ref 0.3–6.2)
ERYTHROCYTE [DISTWIDTH] IN BLOOD BY AUTOMATED COUNT: 13.1 % (ref 12.3–15.4)
GLOBULIN UR ELPH-MCNC: 2.3 GM/DL
GLUCOSE SERPL-MCNC: 110 MG/DL (ref 65–99)
HBA1C MFR BLD: 4.8 % (ref 4.8–5.6)
HCT VFR BLD AUTO: 35.9 % (ref 34–46.6)
HDLC SERPL-MCNC: 48 MG/DL (ref 40–60)
HGB BLD-MCNC: 12.1 G/DL (ref 12–15.9)
IMM GRANULOCYTES # BLD AUTO: 0.04 10*3/MM3 (ref 0–0.05)
IMM GRANULOCYTES NFR BLD AUTO: 0.5 % (ref 0–0.5)
LDLC SERPL CALC-MCNC: 75 MG/DL (ref 0–100)
LDLC/HDLC SERPL: 1.44 {RATIO}
LYMPHOCYTES # BLD AUTO: 2.52 10*3/MM3 (ref 0.7–3.1)
LYMPHOCYTES NFR BLD AUTO: 29.8 % (ref 19.6–45.3)
MCH RBC QN AUTO: 31.7 PG (ref 26.6–33)
MCHC RBC AUTO-ENTMCNC: 33.7 G/DL (ref 31.5–35.7)
MCV RBC AUTO: 94 FL (ref 79–97)
MONOCYTES # BLD AUTO: 0.56 10*3/MM3 (ref 0.1–0.9)
MONOCYTES NFR BLD AUTO: 6.6 % (ref 5–12)
NEUTROPHILS NFR BLD AUTO: 4.82 10*3/MM3 (ref 1.7–7)
NEUTROPHILS NFR BLD AUTO: 57 % (ref 42.7–76)
NRBC BLD AUTO-RTO: 0 /100 WBC (ref 0–0.2)
PLATELET # BLD AUTO: 263 10*3/MM3 (ref 140–450)
PMV BLD AUTO: 9.3 FL (ref 6–12)
POTASSIUM SERPL-SCNC: 4.7 MMOL/L (ref 3.5–5.2)
PROT SERPL-MCNC: 6.9 G/DL (ref 6–8.5)
RBC # BLD AUTO: 3.82 10*6/MM3 (ref 3.77–5.28)
SODIUM SERPL-SCNC: 143 MMOL/L (ref 136–145)
TRIGL SERPL-MCNC: 189 MG/DL (ref 0–150)
VLDLC SERPL-MCNC: 32 MG/DL (ref 5–40)
WBC NRBC COR # BLD: 8.46 10*3/MM3 (ref 3.4–10.8)

## 2022-08-11 PROCEDURE — 25010000002 MIDAZOLAM PER 1 MG: Performed by: INTERNAL MEDICINE

## 2022-08-11 PROCEDURE — 0 IOPAMIDOL PER 1 ML: Performed by: INTERNAL MEDICINE

## 2022-08-11 PROCEDURE — 93458 L HRT ARTERY/VENTRICLE ANGIO: CPT | Performed by: INTERNAL MEDICINE

## 2022-08-11 PROCEDURE — 80061 LIPID PANEL: CPT | Performed by: NURSE PRACTITIONER

## 2022-08-11 PROCEDURE — 36415 COLL VENOUS BLD VENIPUNCTURE: CPT

## 2022-08-11 PROCEDURE — S0260 H&P FOR SURGERY: HCPCS | Performed by: INTERNAL MEDICINE

## 2022-08-11 PROCEDURE — 80053 COMPREHEN METABOLIC PANEL: CPT | Performed by: NURSE PRACTITIONER

## 2022-08-11 PROCEDURE — 83036 HEMOGLOBIN GLYCOSYLATED A1C: CPT | Performed by: NURSE PRACTITIONER

## 2022-08-11 PROCEDURE — C1769 GUIDE WIRE: HCPCS | Performed by: INTERNAL MEDICINE

## 2022-08-11 PROCEDURE — 25010000002 FENTANYL CITRATE (PF) 50 MCG/ML SOLUTION: Performed by: INTERNAL MEDICINE

## 2022-08-11 PROCEDURE — C1894 INTRO/SHEATH, NON-LASER: HCPCS | Performed by: INTERNAL MEDICINE

## 2022-08-11 PROCEDURE — 82565 ASSAY OF CREATININE: CPT

## 2022-08-11 PROCEDURE — 85025 COMPLETE CBC W/AUTO DIFF WBC: CPT | Performed by: NURSE PRACTITIONER

## 2022-08-11 RX ORDER — BUMETANIDE 1 MG/1
1 TABLET ORAL DAILY PRN
Start: 2022-08-11

## 2022-08-11 RX ORDER — NITROGLYCERIN 0.4 MG/1
0.4 TABLET SUBLINGUAL
Status: DISCONTINUED | OUTPATIENT
Start: 2022-08-11 | End: 2022-08-11 | Stop reason: HOSPADM

## 2022-08-11 RX ORDER — MIDAZOLAM HYDROCHLORIDE 1 MG/ML
INJECTION INTRAMUSCULAR; INTRAVENOUS AS NEEDED
Status: DISCONTINUED | OUTPATIENT
Start: 2022-08-11 | End: 2022-08-11 | Stop reason: HOSPADM

## 2022-08-11 RX ORDER — SODIUM CHLORIDE 0.9 % (FLUSH) 0.9 %
10 SYRINGE (ML) INJECTION EVERY 12 HOURS SCHEDULED
Status: DISCONTINUED | OUTPATIENT
Start: 2022-08-11 | End: 2022-08-11 | Stop reason: HOSPADM

## 2022-08-11 RX ORDER — SODIUM CHLORIDE 0.9 % (FLUSH) 0.9 %
10 SYRINGE (ML) INJECTION AS NEEDED
Status: DISCONTINUED | OUTPATIENT
Start: 2022-08-11 | End: 2022-08-11 | Stop reason: HOSPADM

## 2022-08-11 RX ORDER — FENTANYL CITRATE 50 UG/ML
INJECTION, SOLUTION INTRAMUSCULAR; INTRAVENOUS AS NEEDED
Status: DISCONTINUED | OUTPATIENT
Start: 2022-08-11 | End: 2022-08-11 | Stop reason: HOSPADM

## 2022-08-11 RX ORDER — PANTOPRAZOLE SODIUM 40 MG/1
40 TABLET, DELAYED RELEASE ORAL DAILY
COMMUNITY
End: 2023-01-30

## 2022-08-11 RX ORDER — ASPIRIN 81 MG/1
81 TABLET ORAL DAILY
COMMUNITY

## 2022-08-11 RX ORDER — LIDOCAINE HYDROCHLORIDE 10 MG/ML
INJECTION, SOLUTION EPIDURAL; INFILTRATION; INTRACAUDAL; PERINEURAL AS NEEDED
Status: DISCONTINUED | OUTPATIENT
Start: 2022-08-11 | End: 2022-08-11 | Stop reason: HOSPADM

## 2022-08-11 RX ORDER — ASPIRIN 81 MG/1
324 TABLET, CHEWABLE ORAL ONCE
Status: COMPLETED | OUTPATIENT
Start: 2022-08-11 | End: 2022-08-11

## 2022-08-11 RX ORDER — ASPIRIN 81 MG/1
81 TABLET ORAL DAILY
Status: DISCONTINUED | OUTPATIENT
Start: 2022-08-12 | End: 2022-08-11 | Stop reason: HOSPADM

## 2022-08-11 RX ORDER — SODIUM CHLORIDE 9 MG/ML
3 INJECTION, SOLUTION INTRAVENOUS CONTINUOUS
Status: ACTIVE | OUTPATIENT
Start: 2022-08-11 | End: 2022-08-11

## 2022-08-11 RX ADMIN — ASPIRIN 81 MG CHEWABLE TABLET 243 MG: 81 TABLET CHEWABLE at 11:03

## 2022-08-11 RX ADMIN — SODIUM CHLORIDE 3 ML/KG/HR: 9 INJECTION, SOLUTION INTRAVENOUS at 11:01

## 2022-08-11 NOTE — H&P
[facility] at [facility]  Cardiovascular History and Physical Note         Patient is a 67-year-old female with a history of right bundle branch block, hypertension, hyperlipidemia, GERD and anxiety who presents today to undergo cardiac catheterization for recent stress test performed in [place] that suggested moderate inferior lateral and apical inferior fixed defect.  Patient recently underwent right knee replacement at [facility]. One week after returning home she developed chest pain and shortness of breath and presented to [facility] where she ruled out for an acute coronary syndrome with negative troponins and EKG without acute ischemic changes. CT of the chest was negative for pulmonary embolism and venous duplex negative for DVTs.  She underwent stress test which showed fixed inferior lateral and apical defect but no ischemia for which medical management was recommended.  The patient contacted our office reporting ongoing chest pain, shortness of breath and fatigue despite medication adjustments so cardiac catheterization was recommended for definitive assessment of her coronary arteries.    Cardiac risk factors: Advanced age, hypertension, hyperlipidemia    Past medical and surgical history, social and family history reviewed in EMR.    REVIEW OF SYSTEMS:   H&P ROS reviewed and pertinent CV ROS as noted in HPI.         Vital Sign Min/Max for last 24 hours  Temp  Min: 97.6 °F (36.4 °C)  Max: 97.6 °F (36.4 °C)   BP  Min: 125/62  Max: 126/64   Pulse  Min: 62  Max: 62   No data recorded   SpO2  Min: 97 %  Max: 97 %   No data recorded    No intake or output data in the 24 hours ending 08/11/22 1053            Lab Review:   Labs reviewed in the electronic medical record.  Pertinent findings include:  Lab Results   Component Value Date    GLUCOSE 68 03/26/2021    BUN 8 07/14/2022    CREATININE 1.03 07/14/2022    EGFRIFNONA 54 (L) 07/14/2022    EGFRIFAFRI >60 07/14/2022    BCR 8  07/14/2022    K 4.0 07/14/2022    CO2 23 07/14/2022    CALCIUM 9.3 07/14/2022    PROTENTOTREF 6.4 03/26/2021    ALBUMIN 4.4 06/23/2021    LABIL2 1.1 03/26/2021    AST 41 (H) 04/12/2017    ALT 38 04/12/2017     Lab Results   Component Value Date    WBC 8.41 07/14/2022    HGB 10.1 (L) 07/14/2022    HCT 28.4 (L) 07/14/2022    MCV 90 07/14/2022     07/14/2022     Lab Results   Component Value Date    CHLPL 169 03/18/2016    TRIG 253 (H) 03/18/2016    HDL 45 03/18/2016    LDL 49 02/28/2022                Active Hospital Problems    Diagnosis    • **Abnormal nuclear stress test      · Pharmacologic nuclear stress in [place], [place] (7/21/2022): Moderate inferior lateral and apical inferior fixed defect.  LVEF 81%     • Dyspnea on exertion      · Echo (6/19/2020): LVEF 70%.  No significant valvular abnormality.  No mitral valve prolapse.  · Pharmacologic nuclear stress (6/17/2020): No ischemia/infarct.  Normal LVEF  · Echo at [facility] (2/24/2021): LVEF 60%.  Diastolic dysfunction.  Mild MR  · GXT (3/24/2021): Moderately reduced exercise capacity.  No ischemia on EKG.  Normal HR/BP response exercise.     • Chest pain syndrome      · Remote cardiac catheterization revealing normal coronary arteries, data deficit   · Nuclear stress test (04/10/2012): No ischemia/infarct  · Recurrent chest pain at [facility] ER presentation with negative enzymes and EKG, 7/29/2013  · Pharmacologic nuclear stress (03/18/2016): No ischemia.  · Pharmacologic nuclear stress (6/17/2020): Normal perfusion.  Normal LVEF.  · GXT (3/24/2021): Moderate reduced exercise capacity with no ischemic EKG changes.  Normal heart rate/blood pressure response to exercise  · Pharmacologic nuclear stress in [place], [place] (7/21/2022): Moderate inferior lateral and apical inferior fixed defect.  LVEF 81%     • Essential hypertension      • Target blood pressure <130/80 mmHg     • Hyperlipidemia LDL goal <100      · Statin therapy reasonable  for primary prevention     Patient presents today to undergo cardiac catheterization for CCS/NYHA class II symptoms and recent stress test that suggests fixed defect in the anterior, lateral and apical area.  The risks and benefits of the procedure were discussed and the patient is agreeable to proceed.  Of note she does not take aspirin or a   P2 Y 12 inhibitor at home.  She has been premedicated with 324 mg aspirin today         · Lab results pending and if acceptable proceed with cardiac cath via the right radial approach  · Further recommendations to follow      KAN Shah

## 2022-08-16 ENCOUNTER — LAB (OUTPATIENT)
Dept: LAB | Facility: HOSPITAL | Age: 67
End: 2022-08-16

## 2022-08-16 ENCOUNTER — OFFICE VISIT (OUTPATIENT)
Dept: PSYCHIATRY | Facility: CLINIC | Age: 67
End: 2022-08-16

## 2022-08-16 VITALS
HEIGHT: 63 IN | BODY MASS INDEX: 28.88 KG/M2 | HEART RATE: 81 BPM | SYSTOLIC BLOOD PRESSURE: 142 MMHG | WEIGHT: 163 LBS | DIASTOLIC BLOOD PRESSURE: 78 MMHG

## 2022-08-16 DIAGNOSIS — F34.1 DYSTHYMIC DISORDER: ICD-10-CM

## 2022-08-16 DIAGNOSIS — F41.0 PANIC DISORDER (EPISODIC PAROXYSMAL ANXIETY): ICD-10-CM

## 2022-08-16 DIAGNOSIS — F41.0 PANIC DISORDER (EPISODIC PAROXYSMAL ANXIETY): Primary | ICD-10-CM

## 2022-08-16 PROCEDURE — 84443 ASSAY THYROID STIM HORMONE: CPT

## 2022-08-16 PROCEDURE — 36415 COLL VENOUS BLD VENIPUNCTURE: CPT

## 2022-08-16 PROCEDURE — 99214 OFFICE O/P EST MOD 30 MIN: CPT | Performed by: PSYCHIATRY & NEUROLOGY

## 2022-08-16 RX ORDER — ESCITALOPRAM OXALATE 10 MG/1
10 TABLET ORAL DAILY
Qty: 30 TABLET | Refills: 0 | Status: SHIPPED | OUTPATIENT
Start: 2022-08-16 | End: 2022-09-15 | Stop reason: SDUPTHER

## 2022-08-16 RX ORDER — LEVOTHYROXINE SODIUM 0.07 MG/1
TABLET ORAL
COMMUNITY
Start: 2022-07-25

## 2022-08-16 RX ORDER — QUETIAPINE FUMARATE 100 MG/1
100 TABLET, FILM COATED ORAL NIGHTLY
Qty: 90 TABLET | Refills: 1 | Status: SHIPPED | OUTPATIENT
Start: 2022-08-16 | End: 2022-09-29

## 2022-08-16 RX ORDER — ALPRAZOLAM 1 MG/1
TABLET, EXTENDED RELEASE ORAL
Qty: 180 TABLET | Refills: 1 | Status: SHIPPED | OUTPATIENT
Start: 2022-08-16 | End: 2023-01-30 | Stop reason: SDUPTHER

## 2022-08-16 NOTE — PROGRESS NOTES
"Patient ID: Elsa Porras is a 67 y.o. female    SERVICE TYPE: EVALUATION AND MANAGEMENT (greater than 50% of the time spent for supportive psychotherapy).      /78   Pulse 81   Ht 160 cm (62.99\")   Wt 73.9 kg (163 lb)   BMI 28.88 kg/m²     ALLERGIES:  Codeine, Isosorbide nitrate, Levaquin [levofloxacin], Lorcet [hydrocodone-acetaminophen], Other, Percocet [oxycodone-acetaminophen], Remeron [mirtazapine], Septra [sulfamethoxazole-trimethoprim], and Ultram [tramadol]    CC/ Focus of the visit: depression/ anxiety.     HPI: \"Anxious, visited ER 4 weeks ago with a panic attach (Cardiac evaluation normal) one week post right knee replacement. This past 5 week: \"weak, sweating, palpitations, nauseous, right sided headache, can't relax\". Sleeping with the psych meds. Not obsessional, \"I'm blessed\". Depressed, feeling hopeless at times, feel useless, \"not going to hurt myself, nothing like that\". Not taking any opiate pain medicine. Cross sectionally in good spirits describing her recent distress.   Will be checking her thyroid function with a TSH test today.   Only med change, d/c the Celexa with a reduced does of Lexapro 10 mg daily.     PFSH: pleased with her 's behavior, he has stepped up to care for her and take care of the house as she convalesces from her recent knee surgery.    Review of Systems  No cardiopulmonary, GI or neurological complaints.    SUPPORTIVE PSYCHOTHERAPY: continuing efforts to promote the therapeutic alliance, address the patient’s issues, and strengthen self awareness, insights, and positive coping skills such as Exercising, listen to music, spending time in nature and utilizing resources.     Mental Status Exam  Appearance:  appropriate  Attitude toward clinician:  cooperative and agreeable   Speech:    Rate:  regular rate and rhythm   Volume: normal  Motor:  no abnormal movements   Mood: States she has been anxious and depressed  Affect:  euthymic " cross-sectionally  Thought Processes:  linear, logical, and goal directed  Thought Content:  Normal   Feeling Hopeless: absent today  Suicidal Thoughts or Intent:  absent  Homicidal Thoughts:  absent  Perceptual Disturbance: no perceptual disturbance  Attention and Concentration: Only fair  Insight and Judgement:  good  Memory:  memory appears to be intact    LABS:   Recent Results (from the past 168 hour(s))   Comprehensive Metabolic Panel    Collection Time: 08/11/22 10:28 AM    Specimen: Blood   Result Value Ref Range    Glucose 110 (H) 65 - 99 mg/dL    BUN 15 8 - 23 mg/dL    Creatinine 0.84 0.57 - 1.00 mg/dL    Sodium 143 136 - 145 mmol/L    Potassium 4.7 3.5 - 5.2 mmol/L    Chloride 106 98 - 107 mmol/L    CO2 29.0 22.0 - 29.0 mmol/L    Calcium 9.7 8.6 - 10.5 mg/dL    Total Protein 6.9 6.0 - 8.5 g/dL    Albumin 4.60 3.50 - 5.20 g/dL    ALT (SGPT) 23 1 - 33 U/L    AST (SGOT) 23 1 - 32 U/L    Alkaline Phosphatase 106 39 - 117 U/L    Total Bilirubin 0.6 0.0 - 1.2 mg/dL    Globulin 2.3 gm/dL    A/G Ratio 2.0 g/dL    BUN/Creatinine Ratio 17.9 7.0 - 25.0    Anion Gap 8.0 5.0 - 15.0 mmol/L    eGFR 76.8 >60.0 mL/min/1.73   Lipid Panel    Collection Time: 08/11/22 10:28 AM    Specimen: Blood   Result Value Ref Range    Total Cholesterol 155 0 - 200 mg/dL    Triglycerides 189 (H) 0 - 150 mg/dL    HDL Cholesterol 48 40 - 60 mg/dL    LDL Cholesterol  75 0 - 100 mg/dL    VLDL Cholesterol 32 5 - 40 mg/dL    LDL/HDL Ratio 1.44    Hemoglobin A1c    Collection Time: 08/11/22 10:28 AM    Specimen: Blood   Result Value Ref Range    Hemoglobin A1C 4.80 4.80 - 5.60 %   CBC Auto Differential    Collection Time: 08/11/22 10:28 AM    Specimen: Blood   Result Value Ref Range    WBC 8.46 3.40 - 10.80 10*3/mm3    RBC 3.82 3.77 - 5.28 10*6/mm3    Hemoglobin 12.1 12.0 - 15.9 g/dL    Hematocrit 35.9 34.0 - 46.6 %    MCV 94.0 79.0 - 97.0 fL    MCH 31.7 26.6 - 33.0 pg    MCHC 33.7 31.5 - 35.7 g/dL    RDW 13.1 12.3 - 15.4 %    RDW-SD 44.9 37.0  - 54.0 fl    MPV 9.3 6.0 - 12.0 fL    Platelets 263 140 - 450 10*3/mm3    Neutrophil % 57.0 42.7 - 76.0 %    Lymphocyte % 29.8 19.6 - 45.3 %    Monocyte % 6.6 5.0 - 12.0 %    Eosinophil % 5.4 0.3 - 6.2 %    Basophil % 0.7 0.0 - 1.5 %    Immature Grans % 0.5 0.0 - 0.5 %    Neutrophils, Absolute 4.82 1.70 - 7.00 10*3/mm3    Lymphocytes, Absolute 2.52 0.70 - 3.10 10*3/mm3    Monocytes, Absolute 0.56 0.10 - 0.90 10*3/mm3    Eosinophils, Absolute 0.46 (H) 0.00 - 0.40 10*3/mm3    Basophils, Absolute 0.06 0.00 - 0.20 10*3/mm3    Immature Grans, Absolute 0.04 0.00 - 0.05 10*3/mm3    nRBC 0.0 0.0 - 0.2 /100 WBC   POC Creatinine    Collection Time: 08/11/22 10:45 AM    Specimen: Blood   Result Value Ref Range    Creatinine 0.90 0.60 - 1.30 mg/dL       MEDICATION ISSUES: Have discussed with the patient the medications Risks, Benefits, and Side effects including potential falls, possible impaired driving and  metabolic adversities among others. No medication side effects or related complaints today.     TREATMENT PLAN/GOALS: Continue supportive psychotherapy efforts and medications as indicated.     Current Outpatient Medications   Medication Sig Dispense Refill   • ALPRAZolam XR (XANAX XR) 1 MG 24 hr tablet TAKE 1 TABLET BY MOUTH twice a  tablet 1   • aspirin 81 MG EC tablet Take 81 mg by mouth Daily.     • bumetanide (BUMEX) 1 MG tablet Take 1 tablet by mouth Daily As Needed (swelling).     • Cholecalciferol (VITAMIN D PO) Take 50,000 Units by mouth Every 30 (Thirty) Days.     • losartan (COZAAR) 50 MG tablet Take 1 tablet by mouth Daily. 90 tablet 3   • nitroglycerin (NITROSTAT) 0.4 MG SL tablet Place 1 tablet under the tongue Every 5 (Five) Minutes As Needed for Chest Pain. 25 tablet 5   • pantoprazole (PROTONIX) 40 MG EC tablet Take 40 mg by mouth Daily.     • polyethylene glycol (MIRALAX) powder As Needed.  2   • potassium chloride (K-DUR,KLOR-CON) 20 MEQ CR tablet Take 1 tablet by mouth Daily. 90 tablet 3   •  QUEtiapine (SEROquel) 100 MG tablet Take 1 tablet by mouth Every Night. 90 tablet 1   • SIMVASTATIN PO Take  by mouth Every Night. Unsure of dose     • escitalopram (Lexapro) 10 MG tablet Take 1 tablet by mouth Daily. 30 tablet 0   • levothyroxine (SYNTHROID, LEVOTHROID) 75 MCG tablet TAKE 1 TABLET BY MOUTH EVERY MORNING ON AN EMPTY STOMACH AS DIRECTED FOR THYROID     • promethazine (PHENERGAN) 25 MG tablet 2 (Two) Times a Day As Needed.       No current facility-administered medications for this visit.       COLLATERAL PSYCHOTHERAPEUTIC INTERVENTION:  patient not interested in additional psychotherapy.    RISK:  moderate    Assessment & Plan     Diagnoses and all orders for this visit:    1. Panic disorder (episodic paroxysmal anxiety)  (Primary)  -     ALPRAZolam XR (XANAX XR) 1 MG 24 hr tablet; TAKE 1 TABLET BY MOUTH twice a DAY  Dispense: 180 tablet; Refill: 1  -     TSH; Future    2. Dysthymic disorder  -     QUEtiapine (SEROquel) 100 MG tablet; Take 1 tablet by mouth Every Night.  Dispense: 90 tablet; Refill: 1  -     escitalopram (Lexapro) 10 MG tablet; Take 1 tablet by mouth Daily.  Dispense: 30 tablet; Refill: 0  -     TSH; Future        Return in about 4 weeks (around 9/13/2022).           Patient knows to call if symptoms worsen or fail to improve between appointments.    I spent 30 minutes caring for Elsa on this date of service. This time includes time spent by me in the following activities: Patient evaluation, support psychotherapy, decisions, medications, and documentation.     Dictated utilizing QPD dictation    SATURNINO Ibarra MD

## 2022-08-17 LAB — TSH SERPL DL<=0.05 MIU/L-ACNC: 0.38 UIU/ML (ref 0.27–4.2)

## 2022-09-15 ENCOUNTER — PRIOR AUTHORIZATION (OUTPATIENT)
Dept: PSYCHIATRY | Facility: CLINIC | Age: 67
End: 2022-09-15

## 2022-09-15 ENCOUNTER — OFFICE VISIT (OUTPATIENT)
Dept: PSYCHIATRY | Facility: CLINIC | Age: 67
End: 2022-09-15

## 2022-09-15 VITALS
HEART RATE: 85 BPM | WEIGHT: 165 LBS | SYSTOLIC BLOOD PRESSURE: 109 MMHG | DIASTOLIC BLOOD PRESSURE: 59 MMHG | HEIGHT: 63 IN | BODY MASS INDEX: 29.23 KG/M2

## 2022-09-15 DIAGNOSIS — F34.1 DYSTHYMIC DISORDER: ICD-10-CM

## 2022-09-15 DIAGNOSIS — F41.0 PANIC DISORDER (EPISODIC PAROXYSMAL ANXIETY): Primary | ICD-10-CM

## 2022-09-15 PROCEDURE — 99214 OFFICE O/P EST MOD 30 MIN: CPT | Performed by: PSYCHIATRY & NEUROLOGY

## 2022-09-15 RX ORDER — PEN NEEDLE, DIABETIC 31 GX5/16"
1 NEEDLE, DISPOSABLE MISCELLANEOUS 2 TIMES DAILY
COMMUNITY
Start: 2022-08-31

## 2022-09-15 RX ORDER — ACETAMINOPHEN 500 MG
500 TABLET ORAL AS NEEDED
COMMUNITY
End: 2023-01-30

## 2022-09-15 RX ORDER — AMOXICILLIN AND CLAVULANATE POTASSIUM 875; 125 MG/1; MG/1
TABLET, FILM COATED ORAL
COMMUNITY
Start: 2022-09-12 | End: 2023-01-30

## 2022-09-15 RX ORDER — ESCITALOPRAM OXALATE 10 MG/1
10 TABLET ORAL DAILY
Qty: 90 TABLET | Refills: 0 | Status: SHIPPED | OUTPATIENT
Start: 2022-09-15 | End: 2022-12-05 | Stop reason: SDUPTHER

## 2022-09-15 NOTE — TELEPHONE ENCOUNTER
Drug  ALPRAZolam ER 1MG er tablets  Form [employer] Electronic Prior Authorization Request Form (2017 NCPDP)     Key: [id number] - PA Case ID: [id number]     [phone]

## 2022-09-15 NOTE — TELEPHONE ENCOUNTER
[facility] has not yet replied to your PA request. You may close this dialog, return to your dashboard, and perform other tasks.    To check for an update later, open this request again from your dashboard.    If [facility] has not replied to your request within 24 hours for urgent requests or 72 hours for standard requests, please reach out to the plan using the phone number located on the back on the member's insurance card.

## 2022-09-15 NOTE — PROGRESS NOTES
"Patient ID: Elsa Porras is a 67 y.o. female    SERVICE TYPE: EVALUATION AND MANAGEMENT (greater than 50% of the time spent for supportive psychotherapy).      /59   Pulse 85   Ht 160 cm (62.99\")   Wt 74.8 kg (165 lb)   BMI 29.24 kg/m²     ALLERGIES:  Codeine, Isosorbide nitrate, Levaquin [levofloxacin], Lorcet [hydrocodone-acetaminophen], Other, Percocet [oxycodone-acetaminophen], Remeron [mirtazapine], Septra [sulfamethoxazole-trimethoprim], and Ultram [tramadol]    CC/ Focus of the visit: depression/ anxiety    HPI: \"Cannot see much change, but at the same\" patient primarily referring to her complaints of right facial pain and syncopal episodes that do not fit with the diagnosis of anxiety phenomena.  Patient has not been somatic over the course of contact with her these past 20 to 30 years.  Patient is undergoing cardiac evaluation and is working with her primary care physician seeking explanation for her physical complaints.  Today sit to stand blood pressures are normal, do not see that her symptoms would be related to her psychiatric medications.  Encouraged patient to persist along with the help of her PCP to find organic cause as opposed to attributing it to anxiety.  Patient denies being clinically depressed.  Patient reports she is sleeping better and is worried perhaps appropriately so regarding her unexplained symptoms.    Patient has been taking the Seroquel for the past year or longer the do not think it is part of the etiology of the patient's current symptoms. However contacted the patient after she left the clinic and advised her she  best reduce the dosage at least initially to 50 mg at bedtime and possibly eliminate if symptoms persist to make certain it was not a side effect of the Seroquel. Will be contacting the patient to discussed.        PFSH: Patient having difficulties moving about due to her status post right knee replacement plus continuing pelvic symptoms being " status post GYN surgery.  Patient anticipating need for further surgical procedures.    Review of Systems  No cardiopulmonary, GI or neurological complaints.  Continue orthostatic hypotension with palpation     SUPPORTIVE PSYCHOTHERAPY: continuing efforts to promote the therapeutic alliance, address the patient’s issues, and strengthen self awareness, insights, and positive coping skills such as Exercising, listen to music, spending time in nature and utilizing resources.     Mental Status Exam  Appearance:  appropriate  Attitude toward clinician:  cooperative and agreeable   Speech:    Rate:  regular rate and rhythm   Volume: normal  Motor:  no abnormal movements   Mood:  Good  Affect:  euthymic  Thought Processes:  linear, logical, and goal directed  Thought Content:  Normal   Feeling Hopeless: absent  Suicidal Thoughts or Intent:  absent  Homicidal Thoughts:  absent  Perceptual Disturbance: no perceptual disturbance  Attention and Concentration:  good  Insight and Judgement:  good  Memory:  memory appears to be intact    LABS: No results found for this or any previous visit (from the past 168 hour(s)).    MEDICATION ISSUES: Have discussed with the patient the medications Risks, Benefits, and Side effects including potential falls, possible impaired driving and  metabolic adversities among others. No medication side effects or related complaints today.     TREATMENT PLAN/GOALS: Continue supportive psychotherapy efforts and medications as indicated.     Current Outpatient Medications   Medication Sig Dispense Refill   • acetaminophen (TYLENOL) 500 MG tablet Take 500 mg by mouth As Needed for Mild Pain.     • ALPRAZolam XR (XANAX XR) 1 MG 24 hr tablet TAKE 1 TABLET BY MOUTH twice a  tablet 1   • amoxicillin-clavulanate (AUGMENTIN) 875-125 MG per tablet TAKE 1 TABLET BY MOUTH TWICE DAILY WITH FOOD UNTIL GONE FOR INFECTION     • aspirin 81 MG EC tablet Take 81 mg by mouth Daily.     • bumetanide (BUMEX) 1 MG  tablet Take 1 tablet by mouth Daily As Needed (swelling).     • Cholecalciferol (VITAMIN D PO) Take 50,000 Units by mouth Every 30 (Thirty) Days.     • escitalopram (Lexapro) 10 MG tablet Take 1 tablet by mouth Daily. 90 tablet 0   • Lancets Ultra Thin misc 1 each by Other route 2 (Two) Times a Day. use 2 times a day     • levothyroxine (SYNTHROID, LEVOTHROID) 75 MCG tablet TAKE 1 TABLET BY MOUTH EVERY MORNING ON AN EMPTY STOMACH AS DIRECTED FOR THYROID     • losartan (COZAAR) 50 MG tablet Take 1 tablet by mouth Daily. 90 tablet 3   • nitroglycerin (NITROSTAT) 0.4 MG SL tablet Place 1 tablet under the tongue Every 5 (Five) Minutes As Needed for Chest Pain. 25 tablet 5   • polyethylene glycol (MIRALAX) powder As Needed.  2   • potassium chloride (K-DUR,KLOR-CON) 20 MEQ CR tablet Take 1 tablet by mouth Daily. 90 tablet 3   • promethazine (PHENERGAN) 25 MG tablet 2 (Two) Times a Day As Needed.     • QUEtiapine (SEROquel) 100 MG tablet Take 1 tablet by mouth Every Night. 90 tablet 1   • SIMVASTATIN PO Take  by mouth Every Night. Unsure of dose     • pantoprazole (PROTONIX) 40 MG EC tablet Take 40 mg by mouth Daily.       No current facility-administered medications for this visit.       COLLATERAL PSYCHOTHERAPEUTIC INTERVENTION:  patient not interested in additional psychotherapy.    RISK:  moderate    Assessment & Plan To look at safe    Diagnoses and all orders for this visit:    1. Panic disorder (episodic paroxysmal anxiety)  (Primary)  Patient continuing the alprazolam extended release 1 mg twice daily, no apparent issue with prescription misuse or substance abuse.  - Lexapro 10 mg daily    2. Dysthymic disorder  -     escitalopram (Lexapro) 10 MG tablet; Take 1 tablet by mouth Daily.  Dispense: 90 tablet; Refill: 0          -     Patient continuing to take the Seroquel 100 mg at bedtime.    Return in about 3 months (around 12/15/2022).           Patient knows to call if symptoms worsen or fail to improve between  appointments.    I spent 30 minutes caring for Elsa on this date of service. This time includes time spent by me in the following activities: Patient evaluation, support psychotherapy, decisions, medications, and documentation.     Dictated utilizing [employer] dictation    SATURNINO Ibarra MD

## 2022-09-16 ENCOUNTER — HOSPITAL ENCOUNTER (OUTPATIENT)
Dept: ULTRASOUND IMAGING | Facility: HOSPITAL | Age: 67
Discharge: HOME | End: 2022-09-16

## 2022-09-16 ENCOUNTER — TRANSCRIBE ORDERS (OUTPATIENT)
Dept: ADMINISTRATIVE | Facility: HOSPITAL | Age: 67
End: 2022-09-16

## 2022-09-16 DIAGNOSIS — R55 SYNCOPE AND COLLAPSE: Primary | ICD-10-CM

## 2022-09-20 ENCOUNTER — HOSPITAL ENCOUNTER (OUTPATIENT)
Dept: ULTRASOUND IMAGING | Facility: HOSPITAL | Age: 67
Discharge: HOME | End: 2022-09-20
Admitting: FAMILY MEDICINE

## 2022-09-20 DIAGNOSIS — R55 SYNCOPE AND COLLAPSE: ICD-10-CM

## 2022-09-20 PROCEDURE — 93880 EXTRACRANIAL BILAT STUDY: CPT | Performed by: RADIOLOGY

## 2022-09-20 PROCEDURE — 93880 EXTRACRANIAL BILAT STUDY: CPT

## 2022-09-26 ENCOUNTER — TELEPHONE (OUTPATIENT)
Dept: PSYCHIATRY | Facility: CLINIC | Age: 67
End: 2022-09-26

## 2022-09-27 NOTE — TELEPHONE ENCOUNTER
Prior auth was denied. I have submitted an appeal along with records from 2016 office visit and the most recent visit to try to validate how long patient has been stable on this medication.     Faxed appeal to [phone].

## 2022-09-29 RX ORDER — QUETIAPINE FUMARATE 50 MG/1
50 TABLET, FILM COATED ORAL NIGHTLY
Qty: 90 TABLET | Refills: 0 | Status: SHIPPED | OUTPATIENT
Start: 2022-09-29 | End: 2022-12-05 | Stop reason: SDUPTHER

## 2022-11-16 ENCOUNTER — TELEPHONE (OUTPATIENT)
Dept: CARDIOLOGY | Facility: CLINIC | Age: 67
End: 2022-11-16

## 2022-11-16 NOTE — TELEPHONE ENCOUNTER
[facility] is requesting cardiac clearance but had to fax back for specifics. Will await either a return call or fax.

## 2022-11-17 NOTE — TELEPHONE ENCOUNTER
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     She is on ASA 81 mg daily.    Last [facility] 8/11/2022- Mild CAD. LVEF > 70%.

## 2022-12-05 DIAGNOSIS — F34.1 DYSTHYMIC DISORDER: ICD-10-CM

## 2022-12-05 NOTE — TELEPHONE ENCOUNTER
Patient called and needs refill on lexapro, before she has her surgery on the 9th of this month if possible. Pt requesting refills

## 2022-12-06 RX ORDER — ESCITALOPRAM OXALATE 10 MG/1
10 TABLET ORAL DAILY
Qty: 90 TABLET | Refills: 0 | Status: SHIPPED | OUTPATIENT
Start: 2022-12-06 | End: 2023-01-30 | Stop reason: SDUPTHER

## 2022-12-06 RX ORDER — QUETIAPINE FUMARATE 50 MG/1
50 TABLET, FILM COATED ORAL NIGHTLY
Qty: 90 TABLET | Refills: 0 | Status: SHIPPED | OUTPATIENT
Start: 2022-12-06 | End: 2023-01-30 | Stop reason: SDUPTHER

## 2022-12-21 DIAGNOSIS — I10 ESSENTIAL HYPERTENSION: Chronic | ICD-10-CM

## 2022-12-21 RX ORDER — LOSARTAN POTASSIUM 50 MG/1
50 TABLET ORAL DAILY
Qty: 90 TABLET | Refills: 1 | Status: SHIPPED | OUTPATIENT
Start: 2022-12-21

## 2022-12-21 NOTE — TELEPHONE ENCOUNTER
Lab Results   Component Value Date    GLUCOSE 110 (H) 08/11/2022    BUN 15 08/11/2022    CREATININE 0.90 08/11/2022    EGFRIFNONA 54 (L) 07/14/2022    EGFRIFAFRI >60 07/14/2022    BCR 17.9 08/11/2022    K 4.7 08/11/2022    CO2 29.0 08/11/2022    CALCIUM 9.7 08/11/2022    PROTENTOTREF 6.4 03/26/2021    ALBUMIN 4.60 08/11/2022    LABIL2 1.1 03/26/2021    AST 23 08/11/2022    ALT 23 08/11/2022

## 2023-01-30 ENCOUNTER — OFFICE VISIT (OUTPATIENT)
Dept: PSYCHIATRY | Facility: CLINIC | Age: 68
End: 2023-01-30
Payer: MEDICARE

## 2023-01-30 VITALS
DIASTOLIC BLOOD PRESSURE: 66 MMHG | HEART RATE: 74 BPM | SYSTOLIC BLOOD PRESSURE: 121 MMHG | BODY MASS INDEX: 30.16 KG/M2 | WEIGHT: 170.2 LBS

## 2023-01-30 DIAGNOSIS — F34.1 DYSTHYMIC DISORDER: ICD-10-CM

## 2023-01-30 DIAGNOSIS — F41.0 PANIC DISORDER (EPISODIC PAROXYSMAL ANXIETY): Primary | ICD-10-CM

## 2023-01-30 PROCEDURE — 99214 OFFICE O/P EST MOD 30 MIN: CPT | Performed by: PSYCHIATRY & NEUROLOGY

## 2023-01-30 RX ORDER — ALPRAZOLAM 1 MG/1
TABLET, EXTENDED RELEASE ORAL
Qty: 180 TABLET | Refills: 1 | Status: SHIPPED | OUTPATIENT
Start: 2023-01-30

## 2023-01-30 RX ORDER — ESCITALOPRAM OXALATE 10 MG/1
10 TABLET ORAL DAILY
Qty: 90 TABLET | Refills: 1 | Status: SHIPPED | OUTPATIENT
Start: 2023-01-30 | End: 2024-01-30

## 2023-01-30 RX ORDER — QUETIAPINE FUMARATE 50 MG/1
50 TABLET, FILM COATED ORAL NIGHTLY
Qty: 90 TABLET | Refills: 1 | Status: SHIPPED | OUTPATIENT
Start: 2023-01-30

## 2023-01-30 NOTE — PROGRESS NOTES
Patient and: Elsa Porras is a 68 y.o. female    SERVICE TYPE: EVALUATION AND MANAGEMENT (greater than 50% of the time spent for supportive psychotherapy).      /66   Pulse 74   Wt 77.2 kg (170 lb 3.2 oz)   BMI 30.16 kg/m²     ALLERGIES:  Codeine, Isosorbide nitrate, Levaquin [levofloxacin], Lorcet [hydrocodone-acetaminophen], Other, Percocet [oxycodone-acetaminophen], Remeron [mirtazapine], Septra [sulfamethoxazole-trimethoprim], and Ultram [tramadol]    CC/ Focus of the visit: anxiety/ depression    HPI: Patient reports that she is doing fairly well having coped with recent surgery for a pelvic prolapse followed by severe migraines and high doses of prednisone in the past several months.  All that seems to be behind her at this point, off the steroids and without recurrence of a clinical episode of depression.  Perhaps 1 visit to the emergency room with a panic attack during that time.  At interview today patient is calm and pleasant and no immediate distress.  Again has been stabilized on the 1 mg extended release alprazolam along with the Lexapro and bedtime Seroquel.  No indication at all of any prescription misuse or substance abuse.    PFSH: Patient's 's very supportive and assisted patient through her recovery from the surgery and migraine crisis.  Patient seen with her  today.    Review of Systems  No cardiopulmonary, GI or neurological complaints.    SUPPORTIVE PSYCHOTHERAPY: continuing efforts to promote the therapeutic alliance, address the patient’s issues, and strengthen self awareness, insights, and positive coping skills such as Exercising, listen to music, spending time in nature and utilizing resources.     Mental Status Exam  Appearance:  appropriate  Attitude toward clinician:  cooperative and agreeable   Speech:    Rate:  regular rate and rhythm   Volume: normal  Motor:  no abnormal movements   Mood:  Good  Affect:  euthymic  Thought Processes:  linear, logical,  and goal directed  Thought Content:  Normal   Feeling Hopeless: absent  Suicidal Thoughts or Intent:  absent  Homicidal Thoughts:  absent  Perceptual Disturbance: no perceptual disturbance  Attention and Concentration:  good  Insight and Judgement:  good  Memory:  memory appears to be intact    LABS: No results found for this or any previous visit (from the past 168 hour(s)).    MEDICATION ISSUES: Have discussed with the patient the medications Risks, Benefits, and Side effects including potential falls, possible impaired driving and  metabolic adversities among others. No medication side effects or related complaints today.     TREATMENT PLAN/GOALS: Continue supportive psychotherapy efforts and medications as indicated.     Current Outpatient Medications   Medication Sig Dispense Refill   • acetaminophen (TYLENOL) 500 MG tablet Take 500 mg by mouth As Needed for Mild Pain.     • ALPRAZolam XR (XANAX XR) 1 MG 24 hr tablet TAKE 1 TABLET BY MOUTH twice a  tablet 1   • aspirin 81 MG EC tablet Take 81 mg by mouth Daily.     • bumetanide (BUMEX) 1 MG tablet Take 1 tablet by mouth Daily As Needed (swelling).     • Cholecalciferol (VITAMIN D PO) Take 50,000 Units by mouth Every 30 (Thirty) Days.     • escitalopram (Lexapro) 10 MG tablet Take 1 tablet by mouth Daily. 90 tablet 1   • levothyroxine (SYNTHROID, LEVOTHROID) 75 MCG tablet TAKE 1 TABLET BY MOUTH EVERY MORNING ON AN EMPTY STOMACH AS DIRECTED FOR THYROID     • losartan (COZAAR) 50 MG tablet Take 1 tablet by mouth Daily. 90 tablet 1   • nitroglycerin (NITROSTAT) 0.4 MG SL tablet Place 1 tablet under the tongue Every 5 (Five) Minutes As Needed for Chest Pain. 25 tablet 5   • polyethylene glycol (MIRALAX) powder As Needed.  2   • potassium chloride (K-DUR,KLOR-CON) 20 MEQ CR tablet Take 1 tablet by mouth Daily. 90 tablet 3   • promethazine (PHENERGAN) 25 MG tablet 2 (Two) Times a Day As Needed.     • QUEtiapine (SEROquel) 50 MG tablet Take 1 tablet by mouth  Every Night. 90 tablet 1   • SIMVASTATIN PO Take  by mouth Every Night. Unsure of dose     • amoxicillin-clavulanate (AUGMENTIN) 875-125 MG per tablet TAKE 1 TABLET BY MOUTH TWICE DAILY WITH FOOD UNTIL GONE FOR INFECTION     • Lancets Ultra Thin misc 1 each by Other route 2 (Two) Times a Day. use 2 times a day     • pantoprazole (PROTONIX) 40 MG EC tablet Take 40 mg by mouth Daily.       No current facility-administered medications for this visit.       COLLATERAL PSYCHOTHERAPEUTIC INTERVENTION:  patient not interested in additional psychotherapy.    RISK:  moderate    Assessment & Plan     Diagnoses and all orders for this visit:    1. Panic disorder (episodic paroxysmal anxiety)  (Primary)  -     ALPRAZolam XR (XANAX XR) 1 MG 24 hr tablet; TAKE 1 TABLET BY MOUTH twice a DAY  Dispense: 180 tablet; Refill: 1  -     escitalopram (Lexapro) 10 MG tablet; Take 1 tablet by mouth Daily.  Dispense: 90 tablet; Refill: 1    2. Dysthymic disorder  -     escitalopram (Lexapro) 10 MG tablet; Take 1 tablet by mouth Daily.  Dispense: 90 tablet; Refill: 1  -     QUEtiapine (SEROquel) 50 MG tablet; Take 1 tablet by mouth Every Night.  Dispense: 90 tablet; Refill: 1        Return in about 6 months (around 7/30/2023).           Patient knows to call if symptoms worsen or fail to improve between appointments.    I spent 30 minutes caring for Elsa on this date of service. This time includes time spent by me in the following activities: Patient evaluation, support psychotherapy, decisions, medications, and documentation.     Dictated utilizing [employer] dictation    SATURNINO Ibarra MD

## 2023-04-27 ENCOUNTER — TRANSCRIBE ORDERS (OUTPATIENT)
Dept: ADMINISTRATIVE | Facility: HOSPITAL | Age: 68
End: 2023-04-27
Payer: MEDICARE

## 2023-04-27 DIAGNOSIS — Z12.31 SCREENING MAMMOGRAM, ENCOUNTER FOR: Primary | ICD-10-CM

## 2023-05-17 ENCOUNTER — TRANSCRIBE ORDERS (OUTPATIENT)
Dept: ADMINISTRATIVE | Facility: HOSPITAL | Age: 68
End: 2023-05-17
Payer: MEDICARE

## 2023-05-17 DIAGNOSIS — Z78.0 POSTMENOPAUSE: Primary | ICD-10-CM

## 2023-06-09 ENCOUNTER — HOSPITAL ENCOUNTER (OUTPATIENT)
Dept: BONE DENSITY | Facility: HOSPITAL | Age: 68
Discharge: HOME | End: 2023-06-09
Payer: MEDICARE

## 2023-06-09 ENCOUNTER — HOSPITAL ENCOUNTER (OUTPATIENT)
Dept: MAMMOGRAPHY | Facility: HOSPITAL | Age: 68
Discharge: HOME | End: 2023-06-09
Payer: MEDICARE

## 2023-06-09 DIAGNOSIS — Z12.31 SCREENING MAMMOGRAM, ENCOUNTER FOR: ICD-10-CM

## 2023-06-09 DIAGNOSIS — Z78.0 POSTMENOPAUSE: ICD-10-CM

## 2023-06-09 PROCEDURE — 77080 DXA BONE DENSITY AXIAL: CPT

## 2023-06-09 PROCEDURE — 77063 BREAST TOMOSYNTHESIS BI: CPT

## 2023-06-09 PROCEDURE — 77080 DXA BONE DENSITY AXIAL: CPT | Performed by: RADIOLOGY

## 2023-06-09 PROCEDURE — 77067 SCR MAMMO BI INCL CAD: CPT

## 2023-08-01 ENCOUNTER — OFFICE VISIT (OUTPATIENT)
Dept: PSYCHIATRY | Facility: CLINIC | Age: 68
End: 2023-08-01
Payer: MEDICARE

## 2023-08-01 VITALS
HEART RATE: 76 BPM | SYSTOLIC BLOOD PRESSURE: 104 MMHG | BODY MASS INDEX: 30.44 KG/M2 | WEIGHT: 171.8 LBS | HEIGHT: 63 IN | DIASTOLIC BLOOD PRESSURE: 61 MMHG

## 2023-08-01 DIAGNOSIS — F34.1 DYSTHYMIC DISORDER: ICD-10-CM

## 2023-08-01 DIAGNOSIS — F41.0 PANIC DISORDER (EPISODIC PAROXYSMAL ANXIETY): Primary | ICD-10-CM

## 2023-08-01 RX ORDER — ALPRAZOLAM 1 MG/1
TABLET, EXTENDED RELEASE ORAL
Qty: 180 TABLET | Refills: 1 | Status: SHIPPED | OUTPATIENT
Start: 2023-08-01

## 2023-08-01 RX ORDER — ESTRADIOL 0.07 MG/D
PATCH, EXTENDED RELEASE TRANSDERMAL
COMMUNITY
Start: 2023-07-14

## 2023-08-01 RX ORDER — THIAMINE HCL 100 MG
TABLET ORAL DAILY
COMMUNITY

## 2023-08-01 RX ORDER — ESCITALOPRAM OXALATE 10 MG/1
10 TABLET ORAL DAILY
Qty: 90 TABLET | Refills: 1 | Status: SHIPPED | OUTPATIENT
Start: 2023-08-01 | End: 2024-07-31

## 2023-08-29 NOTE — PROGRESS NOTES
Cardiology Outpatient Visit      Identification: Elsa Porras is a 68 y.o. female who resides in [place], [place]    Reason for visit:  Palpitations      Subjective      Patient is a 68-year-old female who returns today for follow-up of coronary artery disease, shortness of breath, palpitations and cardiac risk factors.  The patient underwent cardiac catheterization last year for complaints of chest pain.  Coronary angiography showed mild CAD but no significant CAD requiring intervention.  The patient still reports having some occasional chest discomfort that is not related to any exertional activity.  She does get short of breath but her Bumex that she takes 3 times a week keeps that fairly controlled.  She still has fatigue.  She had a partial hysterectomy in December and is actually due to undergo a hemorrhoidectomy surgery soon for a rectocele with prolapse.  She reports having a recent colonoscopy and has not been taking her aspirin as they told her know when NSAIDs for 2 weeks.    Review of Systems   Constitutional: Negative for malaise/fatigue.   Eyes:  Negative for vision loss in left eye and vision loss in right eye.   Cardiovascular:  Negative for chest pain, dyspnea on exertion, near-syncope, orthopnea, palpitations, paroxysmal nocturnal dyspnea and syncope.   Musculoskeletal:  Negative for myalgias.   Neurological:  Negative for brief paralysis, excessive daytime sleepiness, focal weakness, numbness, paresthesias and weakness.   All other systems reviewed and are negative.    Allergies   Allergen Reactions    Codeine      gastrointestinal upset.       Isosorbide Nitrate Headache    Levaquin [Levofloxacin] Hives    Lorcet [Hydrocodone-Acetaminophen]       rash, nausea, vomiting.     Other      DARVOCET, rash, nausea, vomiting,     Percocet [Oxycodone-Acetaminophen]      rash, nausea and vomiting.    Remeron [Mirtazapine] Nausea Only and Other (See Comments)     Abdominal Bloating    Septra  "[Sulfamethoxazole-Trimethoprim] Hives    Ultram [Tramadol]       rash, nausea, vomiting,     Zofran [Ondansetron Hcl] Rash         Current Outpatient Medications   Medication Instructions    ALPRAZolam XR (XANAX XR) 1 MG 24 hr tablet TAKE 1 TABLET BY MOUTH twice a DAY    aspirin 81 mg, Oral, Daily    bumetanide (BUMEX) 1 mg, Oral, Daily PRN    Calcium Citrate-Vitamin D3 (CITRACAL) 315-6.25 MG-MCG tablet tablet Oral, Daily    docusate sodium (COLACE) 250 mg, Oral, Daily    Thea 0.075 MG/24HR patch APPLY ONE PATCH TO AFFECTED AREA (CLEAN DRY SKIN) TWO TIMES A WEEK AS DIRECTED BY YOUR PRESCRIBER FOR HORMONE REPLACEMENT THERAPY    escitalopram (LEXAPRO) 10 mg, Oral, Daily    levothyroxine (SYNTHROID, LEVOTHROID) 75 MCG tablet TAKE 1 TABLET BY MOUTH EVERY MORNING ON AN EMPTY STOMACH AS DIRECTED FOR THYROID    nitroglycerin (NITROSTAT) 0.4 mg, Sublingual, Every 5 Minutes PRN    polyethylene glycol (MIRALAX) powder As Needed    potassium chloride (K-DUR,KLOR-CON) 20 MEQ CR tablet 20 mEq, Oral, Daily    promethazine (PHENERGAN) 25 MG tablet 2 Times Daily PRN    QUEtiapine (SEROQUEL) 50 mg, Oral, Nightly    SIMVASTATIN PO 40 mg, Oral, Nightly, Unsure of dose    topiramate (TOPAMAX) 50 mg, Oral, Daily         Objective     /82   Pulse 60   Resp 20   Ht 160 cm (63\")   Wt 78 kg (172 lb)   SpO2 95%   BMI 30.47 kg/m²       Constitutional:       Appearance: Healthy appearance. Well-developed.   Eyes:      General: Lids are normal. No scleral icterus.     Conjunctiva/sclera: Conjunctivae normal.   HENT:      Head: Normocephalic and atraumatic.   Neck:      Thyroid: No thyromegaly.      Vascular: No carotid bruit or JVD.   Pulmonary:      Effort: Pulmonary effort is normal.      Breath sounds: Normal breath sounds. No wheezing. No rhonchi. No rales.   Cardiovascular:      Normal rate. Regular rhythm.      Murmurs: There is no murmur.      No gallop.  No rub.   Pulses:     Intact distal pulses.   Edema:     Peripheral " edema absent.   Abdominal:      General: There is no distension.      Palpations: Abdomen is soft. There is no abdominal mass.   Musculoskeletal:      Cervical back: Normal range of motion. Skin:     General: Skin is warm and dry.      Findings: No rash.   Neurological:      General: No focal deficit present.      Mental Status: Alert and oriented to person, place, and time.      Gait: Gait is intact.   Psychiatric:         Attention and Perception: Attention normal.         Mood and Affect: Mood normal.         Behavior: Behavior normal.       Result Review  (reviewed with patient):        ECG 12 Lead    Date/Time: 9/5/2023 3:49 PM  Performed by: Anjali Young APRN  Authorized by: Anjali Young APRN   Comparison: compared with previous ECG from 12/3/2021  Similar to previous ECG  Rhythm: sinus rhythm  Conduction: right bundle branch block    Clinical impression: abnormal EKG  Comments: QT/QTc 434/434 MS         Lab Results   Component Value Date    GLUCOSE 110 (H) 08/11/2022    BUN 9 01/16/2023    CREATININE 0.90 08/11/2022    EGFR 76.8 08/11/2022    BCR 17.9 08/11/2022    K 4.7 08/11/2022    CO2 29.0 08/11/2022    CALCIUM 9.7 08/11/2022    PROTENTOTREF 6.4 03/26/2021    ALBUMIN 4.60 08/11/2022    BILITOT 0.6 08/11/2022    AST 23 08/11/2022    ALT 23 08/11/2022     Lab Results   Component Value Date    WBC 8.46 08/11/2022    HGB 12.1 08/11/2022    HCT 35.9 08/11/2022    MCV 94.0 08/11/2022     08/11/2022     Lab Results   Component Value Date    CHOL 155 08/11/2022    CHLPL 169 03/18/2016    TRIG 189 (H) 08/11/2022    HDL 48 08/11/2022    LDL 75 08/11/2022     Lab Results   Component Value Date    HGBA1C 4.80 08/11/2022           Assessment     Diagnoses and all orders for this visit:    1. Coronary artery disease involving native coronary artery of native heart with angina pectoris (Primary)  Overview:  Longstanding chest pain symptoms with normal evaluation  Pharmacologic nuclear stress  (6/17/2020): Normal perfusion.  Normal LVEF.  GXT (3/24/2021): Moderate reduced exercise capacity with no ischemic EKG changes.  Normal heart rate/blood pressure response to exercise  Pharmacologic nuclear stress in [place], [place] (7/21/2022): Moderate inferior lateral and apical inferior fixed defect.  LVEF 81%  Cardiac catheterization (8/11/2020): Mild coronary artery disease.  Normal LV filling pressure  Cardiac cath (8/11/2022): Mild CAD    Assessment & Plan:  Reassurance provided to patient that fatigue is not due to CAD as cath showed minimal/mild CAD  Continue aspirin 81 mg daily      2. Dyspnea on exertion  Overview:  Echo (6/19/2020): LVEF 70%.  No significant valvular abnormality.  No mitral valve prolapse.  Pharmacologic nuclear stress (6/17/2020): No ischemia/infarct.  Normal LVEF  Echo at [facility] (2/24/2021): LVEF 60%.  Diastolic dysfunction.  Mild MR  GXT (3/24/2021): Moderately reduced exercise capacity.  No ischemia on EKG.  Normal HR/BP response exercise.    Assessment & Plan:  Patient reports breathing stable  Continue Bumex 1 mg as needed for lower extremity edema and breathing.  She is currently taking it 3 times a week      3. Essential hypertension  Overview:  Target blood pressure <130/80 mmHg    Assessment & Plan:  Hypertension is controlled        4. Hyperlipidemia LDL goal <70  Overview:  High intensity statin therapy indicated given the presence of CAD    Assessment & Plan:  Continue simvastatin 40 mg nightly            Plan   Reassurance provided to patient regarding results of cardiac cath that showed mild/minimal CAD  Patient to restart aspirin 81 mg daily given presence of CAD.  She should not hold aspirin unless cleared by cardiology  Follow-up with Dr. Harris and her behavioral health physician for complaints of fatigue as this does not appear to be cardiac related but could be a potential side effect of her Seroquel.  Recent echo at [place] was within normal  limits      Follow-up   Return in about 1 year (around 9/5/2024), or if symptoms worsen or fail to improve, for Follow-up with Dr. Ga next visit.      Anjali Young, APRN  9/5/2023

## 2023-09-05 ENCOUNTER — OFFICE VISIT (OUTPATIENT)
Dept: CARDIOLOGY | Facility: CLINIC | Age: 68
End: 2023-09-05
Payer: MEDICARE

## 2023-09-05 VITALS
WEIGHT: 172 LBS | RESPIRATION RATE: 20 BRPM | SYSTOLIC BLOOD PRESSURE: 146 MMHG | HEART RATE: 60 BPM | HEIGHT: 63 IN | OXYGEN SATURATION: 95 % | BODY MASS INDEX: 30.48 KG/M2 | DIASTOLIC BLOOD PRESSURE: 82 MMHG

## 2023-09-05 DIAGNOSIS — E78.5 HYPERLIPIDEMIA LDL GOAL <70: ICD-10-CM

## 2023-09-05 DIAGNOSIS — I10 ESSENTIAL HYPERTENSION: Chronic | ICD-10-CM

## 2023-09-05 DIAGNOSIS — R06.09 DYSPNEA ON EXERTION: ICD-10-CM

## 2023-09-05 DIAGNOSIS — I25.119 CORONARY ARTERY DISEASE INVOLVING NATIVE CORONARY ARTERY OF NATIVE HEART WITH ANGINA PECTORIS: Primary | ICD-10-CM

## 2023-09-05 PROCEDURE — 93000 ELECTROCARDIOGRAM COMPLETE: CPT | Performed by: NURSE PRACTITIONER

## 2023-09-05 PROCEDURE — 3077F SYST BP >= 140 MM HG: CPT | Performed by: NURSE PRACTITIONER

## 2023-09-05 PROCEDURE — 1159F MED LIST DOCD IN RCRD: CPT | Performed by: NURSE PRACTITIONER

## 2023-09-05 PROCEDURE — 1160F RVW MEDS BY RX/DR IN RCRD: CPT | Performed by: NURSE PRACTITIONER

## 2023-09-05 PROCEDURE — 99214 OFFICE O/P EST MOD 30 MIN: CPT | Performed by: NURSE PRACTITIONER

## 2023-09-05 PROCEDURE — 3079F DIAST BP 80-89 MM HG: CPT | Performed by: NURSE PRACTITIONER

## 2023-09-05 RX ORDER — DOCUSATE SODIUM 250 MG
250 CAPSULE ORAL DAILY
COMMUNITY
Start: 2023-08-16

## 2023-09-05 RX ORDER — TOPIRAMATE 50 MG/1
50 TABLET, FILM COATED ORAL DAILY
COMMUNITY
Start: 2023-08-16

## 2023-09-05 NOTE — ASSESSMENT & PLAN NOTE
Reassurance provided to patient that fatigue is not due to CAD as cath showed minimal/mild CAD  Continue aspirin 81 mg daily

## 2023-09-05 NOTE — ASSESSMENT & PLAN NOTE
Patient reports breathing stable  Continue Bumex 1 mg as needed for lower extremity edema and breathing.  She is currently taking it 3 times a week

## 2023-09-25 DIAGNOSIS — F34.1 DYSTHYMIC DISORDER: ICD-10-CM

## 2023-09-26 RX ORDER — QUETIAPINE FUMARATE 50 MG/1
TABLET, FILM COATED ORAL
Qty: 90 TABLET | Refills: 0 | Status: SHIPPED | OUTPATIENT
Start: 2023-09-26

## 2023-11-16 ENCOUNTER — HOSPITAL ENCOUNTER (OUTPATIENT)
Dept: GENERAL RADIOLOGY | Facility: HOSPITAL | Age: 68
Discharge: HOME | End: 2023-11-16
Admitting: NURSE PRACTITIONER
Payer: MEDICARE

## 2023-11-16 ENCOUNTER — TRANSCRIBE ORDERS (OUTPATIENT)
Dept: LAB | Facility: HOSPITAL | Age: 68
End: 2023-11-16
Payer: MEDICARE

## 2023-11-16 DIAGNOSIS — R05.1 ACUTE COUGH: ICD-10-CM

## 2023-11-16 DIAGNOSIS — R05.1 ACUTE COUGH: Primary | ICD-10-CM

## 2023-11-16 PROCEDURE — 71046 X-RAY EXAM CHEST 2 VIEWS: CPT

## 2024-01-23 DIAGNOSIS — F41.0 PANIC DISORDER (EPISODIC PAROXYSMAL ANXIETY): ICD-10-CM

## 2024-01-23 DIAGNOSIS — F34.1 DYSTHYMIC DISORDER: ICD-10-CM

## 2024-01-23 RX ORDER — ESCITALOPRAM OXALATE 10 MG/1
TABLET ORAL
Qty: 90 TABLET | Refills: 0 | OUTPATIENT
Start: 2024-01-23

## 2024-01-23 RX ORDER — ALPRAZOLAM 1 MG/1
TABLET, EXTENDED RELEASE ORAL
Qty: 180 TABLET | Refills: 0 | OUTPATIENT
Start: 2024-01-23

## 2024-01-23 RX ORDER — QUETIAPINE FUMARATE 50 MG/1
TABLET, FILM COATED ORAL
Qty: 90 TABLET | Refills: 0 | OUTPATIENT
Start: 2024-01-23

## 2024-01-23 NOTE — TELEPHONE ENCOUNTER
Patient is having to reschedule due to  having surgery requesting refills till she can be seen again

## 2024-01-24 DIAGNOSIS — F34.1 DYSTHYMIC DISORDER: ICD-10-CM

## 2024-01-25 DIAGNOSIS — F34.1 DYSTHYMIC DISORDER: ICD-10-CM

## 2024-01-25 DIAGNOSIS — F41.0 PANIC DISORDER (EPISODIC PAROXYSMAL ANXIETY): ICD-10-CM

## 2024-01-25 RX ORDER — ALPRAZOLAM 1 MG/1
TABLET, EXTENDED RELEASE ORAL
Qty: 60 TABLET | Refills: 0 | Status: SHIPPED | OUTPATIENT
Start: 2024-01-25

## 2024-01-25 RX ORDER — ESCITALOPRAM OXALATE 10 MG/1
10 TABLET ORAL DAILY
Qty: 90 TABLET | Refills: 1 | Status: CANCELLED | OUTPATIENT
Start: 2024-01-25 | End: 2025-01-24

## 2024-01-25 RX ORDER — QUETIAPINE FUMARATE 50 MG/1
50 TABLET, FILM COATED ORAL NIGHTLY
Qty: 90 TABLET | Refills: 0 | Status: SHIPPED | OUTPATIENT
Start: 2024-01-25

## 2024-01-25 RX ORDER — ALPRAZOLAM 1 MG/1
TABLET, EXTENDED RELEASE ORAL
Qty: 180 TABLET | Refills: 1 | Status: CANCELLED | OUTPATIENT
Start: 2024-01-25

## 2024-01-25 RX ORDER — ESCITALOPRAM OXALATE 10 MG/1
TABLET ORAL
Qty: 90 TABLET | Refills: 0 | Status: SHIPPED | OUTPATIENT
Start: 2024-01-25

## 2024-02-29 ENCOUNTER — OFFICE VISIT (OUTPATIENT)
Dept: PSYCHIATRY | Facility: CLINIC | Age: 69
End: 2024-02-29
Payer: MEDICARE

## 2024-02-29 VITALS
DIASTOLIC BLOOD PRESSURE: 70 MMHG | BODY MASS INDEX: 30.72 KG/M2 | HEART RATE: 71 BPM | HEIGHT: 63 IN | OXYGEN SATURATION: 93 % | SYSTOLIC BLOOD PRESSURE: 132 MMHG | WEIGHT: 173.4 LBS

## 2024-02-29 DIAGNOSIS — F34.1 DYSTHYMIC DISORDER: ICD-10-CM

## 2024-02-29 DIAGNOSIS — F41.0 PANIC DISORDER (EPISODIC PAROXYSMAL ANXIETY): ICD-10-CM

## 2024-02-29 RX ORDER — ALPRAZOLAM 1 MG/1
TABLET, EXTENDED RELEASE ORAL
Qty: 180 TABLET | Refills: 0 | Status: SHIPPED | OUTPATIENT
Start: 2024-02-29

## 2024-02-29 RX ORDER — CETIRIZINE HYDROCHLORIDE 10 MG/1
TABLET ORAL
COMMUNITY
Start: 2024-01-17

## 2024-02-29 RX ORDER — ESCITALOPRAM OXALATE 10 MG/1
TABLET ORAL
Qty: 30 TABLET | Refills: 0 | Status: SHIPPED | OUTPATIENT
Start: 2024-04-01

## 2024-02-29 NOTE — PROGRESS NOTES
"Patient ID: Elsa Porras is a 69 y.o. female    SERVICE TYPE: EVALUATION AND MANAGEMENT (greater than 50% of the time spent for supportive psychotherapy).      /70   Pulse 71   Ht 160 cm (62.99\")   Wt 78.7 kg (173 lb 6.4 oz)   SpO2 93%   BMI 30.72 kg/m²     ALLERGIES:  Codeine, Isosorbide nitrate, Levaquin [levofloxacin], Lorcet [hydrocodone-acetaminophen], Other, Percocet [oxycodone-acetaminophen], Remeron [mirtazapine], Septra [sulfamethoxazole-trimethoprim], Ultram [tramadol], and Zofran [ondansetron hcl]    CC/ Focus of the visit: depression/ anxiety    HPI: Has been stressed by her 's illness and demands with  helping a relative. Patient has her own health issues with generalized myalgia and back pain.  No narcotics and no indication of misuse or abuse longstanding prescribed alprazolam.  No addictive behaviors.  She has not had experienced an episode of clinical depression but periods of discouragement due to events and circumstances.  General interest and activity impacted by her circumstances.  Reviewed her medication status with her pharmacy, she should have a 2-month supply of the Lexapro but will need a prescription for alprazolam.    PFSH: Home life with her  stable aside from their current health care issues.  Very supportive of each other.    Review of Systems  No cardiopulmonary, GI or neurological complaints.    SUPPORTIVE PSYCHOTHERAPY: continuing efforts to promote the therapeutic alliance, address the patient’s issues, and strengthen self awareness, insights, and positive coping skills such as Exercising, listen to music, spending time in nature and utilizing resources.     Mental Status Exam  Appearance:  appropriate  Attitude toward clinician:  cooperative and agreeable   Speech:    Rate:  regular rate and rhythm   Volume: normal  Motor:  no abnormal movements   Mood:  Good  Affect:  euthymic  Thought Processes:  linear, logical, and goal " directed  Thought Content:  Normal   Feeling Hopeless: absent  Suicidal Thoughts or Intent:  absent  Homicidal Thoughts:  absent  Perceptual Disturbance: no perceptual disturbance  Attention and Concentration:  good  Insight and Judgement:  good  Memory:  memory appears to be intact    LABS: No results found for this or any previous visit (from the past 168 hour(s)).    MEDICATION ISSUES: Have discussed with the patient the medications Risks, Benefits, and Side effects including potential falls, possible impaired driving and  metabolic adversities among others. No medication side effects or related complaints today.     TREATMENT PLAN/GOALS: Continue supportive psychotherapy efforts and medications as indicated.     Current Outpatient Medications   Medication Sig Dispense Refill    ALPRAZolam XR (XANAX XR) 1 MG 24 hr tablet TAKE 1 TABLET BY MOUTH TWO TIMES A DAY FOR ANXIETY 180 tablet 0    aspirin 81 MG EC tablet Take 1 tablet by mouth Daily.      bumetanide (BUMEX) 1 MG tablet Take 1 tablet by mouth Daily As Needed (swelling).      Calcium Citrate-Vitamin D3 (CITRACAL) 315-6.25 MG-MCG tablet tablet Take  by mouth Daily.      cetirizine (zyrTEC) 10 MG tablet       docusate sodium (COLACE) 250 MG capsule Take 1 capsule by mouth Daily.      Thea 0.075 MG/24HR patch APPLY ONE PATCH TO AFFECTED AREA (CLEAN DRY SKIN) TWO TIMES A WEEK AS DIRECTED BY YOUR PRESCRIBER FOR HORMONE REPLACEMENT THERAPY      [START ON 4/1/2024] escitalopram (LEXAPRO) 10 MG tablet Take 1 daily 30 tablet 0    levothyroxine (SYNTHROID, LEVOTHROID) 75 MCG tablet TAKE 1 TABLET BY MOUTH EVERY MORNING ON AN EMPTY STOMACH AS DIRECTED FOR THYROID      nitroglycerin (NITROSTAT) 0.4 MG SL tablet Place 1 tablet under the tongue Every 5 (Five) Minutes As Needed for Chest Pain. 25 tablet 5    polyethylene glycol (MIRALAX) powder As Needed.  2    potassium chloride (K-DUR,KLOR-CON) 20 MEQ CR tablet Take 1 tablet by mouth Daily. 90 tablet 3    promethazine  (PHENERGAN) 25 MG tablet 2 (Two) Times a Day As Needed.      QUEtiapine (SEROquel) 50 MG tablet Take 1 tablet by mouth Every Night. 90 tablet 0    SIMVASTATIN PO Take 40 mg by mouth Every Night. Unsure of dose      topiramate (TOPAMAX) 50 MG tablet Take 1 tablet by mouth Daily.       No current facility-administered medications for this visit.       COLLATERAL PSYCHOTHERAPEUTIC INTERVENTION:  patient not interested in additional psychotherapy.    RISK: Moderate    Assessment & Plan     Diagnoses and all orders for this visit:    1. Panic disorder (episodic paroxysmal anxiety)  -     ALPRAZolam XR (XANAX XR) 1 MG 24 hr tablet; TAKE 1 TABLET BY MOUTH TWO TIMES A DAY FOR ANXIETY  Dispense: 180 tablet; Refill: 0  -     escitalopram (LEXAPRO) 10 MG tablet; Take 1 daily  Dispense: 30 tablet; Refill: 0    2. Dysthymic disorder  -     escitalopram (LEXAPRO) 10 MG tablet; Take 1 daily  Dispense: 30 tablet; Refill: 0        Return in about 3 months (around 5/29/2024).           Patient knows to call if symptoms worsen or fail to improve between appointments.    I spent 30 minutes caring for Elsa on this date of service. This time includes time spent by me in the following activities: Patient evaluation, support psychotherapy, decisions, medications, and documentation.     Dictated utilizing [employer] dictation    SATURNINO Ibarra MD

## 2024-05-15 DIAGNOSIS — F34.1 DYSTHYMIC DISORDER: ICD-10-CM

## 2024-05-16 RX ORDER — QUETIAPINE FUMARATE 50 MG/1
TABLET, FILM COATED ORAL
Qty: 90 TABLET | Refills: 0 | Status: SHIPPED | OUTPATIENT
Start: 2024-05-16

## 2024-05-23 DIAGNOSIS — F41.0 PANIC DISORDER (EPISODIC PAROXYSMAL ANXIETY): ICD-10-CM

## 2024-05-23 DIAGNOSIS — F34.1 DYSTHYMIC DISORDER: ICD-10-CM

## 2024-05-23 RX ORDER — ESCITALOPRAM OXALATE 10 MG/1
TABLET ORAL
Qty: 30 TABLET | Refills: 0 | Status: SHIPPED | OUTPATIENT
Start: 2024-05-23

## 2024-05-23 RX ORDER — ALPRAZOLAM 1 MG/1
TABLET, EXTENDED RELEASE ORAL
Qty: 180 TABLET | Refills: 0 | OUTPATIENT
Start: 2024-05-23

## 2024-05-24 DIAGNOSIS — F41.0 PANIC DISORDER (EPISODIC PAROXYSMAL ANXIETY): ICD-10-CM

## 2024-05-24 RX ORDER — ALPRAZOLAM 1 MG/1
TABLET, EXTENDED RELEASE ORAL
Qty: 180 TABLET | Refills: 0 | Status: SHIPPED | OUTPATIENT
Start: 2024-05-27

## 2024-05-24 NOTE — TELEPHONE ENCOUNTER
Patient called states she will be out of her medicine on the 30 th and was worried she would have to go without. Requesting refill be sent in

## 2024-06-03 ENCOUNTER — TRANSCRIBE ORDERS (OUTPATIENT)
Dept: ADMINISTRATIVE | Facility: HOSPITAL | Age: 69
End: 2024-06-03
Payer: MEDICARE

## 2024-06-03 DIAGNOSIS — R10.33 UMBILICAL PAIN: Primary | ICD-10-CM

## 2024-06-11 ENCOUNTER — HOSPITAL ENCOUNTER (OUTPATIENT)
Dept: ULTRASOUND IMAGING | Facility: HOSPITAL | Age: 69
Discharge: HOME | End: 2024-06-11
Admitting: COLON & RECTAL SURGERY
Payer: MEDICARE

## 2024-06-11 DIAGNOSIS — R10.33 UMBILICAL PAIN: ICD-10-CM

## 2024-06-11 PROCEDURE — 76856 US EXAM PELVIC COMPLETE: CPT

## 2024-07-17 ENCOUNTER — TRANSCRIBE ORDERS (OUTPATIENT)
Dept: ADMINISTRATIVE | Facility: HOSPITAL | Age: 69
End: 2024-07-17
Payer: MEDICARE

## 2024-07-17 DIAGNOSIS — Z12.31 BREAST CANCER SCREENING BY MAMMOGRAM: Primary | ICD-10-CM

## 2024-07-26 ENCOUNTER — HOSPITAL ENCOUNTER (OUTPATIENT)
Dept: MAMMOGRAPHY | Facility: HOSPITAL | Age: 69
Discharge: HOME | End: 2024-07-26
Admitting: FAMILY MEDICINE
Payer: MEDICARE

## 2024-07-26 DIAGNOSIS — Z12.31 BREAST CANCER SCREENING BY MAMMOGRAM: ICD-10-CM

## 2024-07-26 PROCEDURE — 77063 BREAST TOMOSYNTHESIS BI: CPT

## 2024-07-26 PROCEDURE — 77067 SCR MAMMO BI INCL CAD: CPT

## 2024-07-26 PROCEDURE — 77067 SCR MAMMO BI INCL CAD: CPT | Performed by: RADIOLOGY

## 2024-07-26 PROCEDURE — 77063 BREAST TOMOSYNTHESIS BI: CPT | Performed by: RADIOLOGY

## 2024-08-13 ENCOUNTER — OFFICE VISIT (OUTPATIENT)
Dept: PSYCHIATRY | Facility: CLINIC | Age: 69
End: 2024-08-13
Payer: MEDICARE

## 2024-08-13 VITALS
DIASTOLIC BLOOD PRESSURE: 80 MMHG | WEIGHT: 171.2 LBS | HEART RATE: 90 BPM | HEIGHT: 63 IN | BODY MASS INDEX: 30.33 KG/M2 | OXYGEN SATURATION: 98 % | SYSTOLIC BLOOD PRESSURE: 126 MMHG

## 2024-08-13 DIAGNOSIS — F34.1 DYSTHYMIC DISORDER: ICD-10-CM

## 2024-08-13 DIAGNOSIS — F41.0 PANIC DISORDER (EPISODIC PAROXYSMAL ANXIETY): ICD-10-CM

## 2024-08-13 PROCEDURE — 99214 OFFICE O/P EST MOD 30 MIN: CPT | Performed by: PSYCHIATRY & NEUROLOGY

## 2024-08-13 PROCEDURE — 3079F DIAST BP 80-89 MM HG: CPT | Performed by: PSYCHIATRY & NEUROLOGY

## 2024-08-13 PROCEDURE — 3074F SYST BP LT 130 MM HG: CPT | Performed by: PSYCHIATRY & NEUROLOGY

## 2024-08-13 RX ORDER — FAMOTIDINE 20 MG/1
TABLET, FILM COATED ORAL
COMMUNITY
Start: 2024-07-02

## 2024-08-13 RX ORDER — DOXEPIN HYDROCHLORIDE 25 MG/1
CAPSULE ORAL
COMMUNITY
Start: 2024-07-23

## 2024-08-13 RX ORDER — QUETIAPINE FUMARATE 50 MG/1
50 TABLET, FILM COATED ORAL NIGHTLY
Qty: 90 TABLET | Refills: 1 | Status: SHIPPED | OUTPATIENT
Start: 2024-08-13

## 2024-08-13 RX ORDER — ALPRAZOLAM 1 MG/1
TABLET, EXTENDED RELEASE ORAL
Qty: 180 TABLET | Refills: 1 | Status: SHIPPED | OUTPATIENT
Start: 2024-08-13

## 2024-08-13 RX ORDER — FEXOFENADINE HCL 180 MG/1
TABLET ORAL
COMMUNITY
Start: 2024-07-23

## 2024-08-13 NOTE — PROGRESS NOTES
"Patient ID: Elsa Porras is a 69 y.o. female    SERVICE TYPE: EVALUATION AND MANAGEMENT (greater than 50% of the time spent for supportive psychotherapy).      /80   Pulse 90   Ht 160 cm (62.99\")   Wt 77.7 kg (171 lb 3.2 oz)   SpO2 98%   BMI 30.33 kg/m²     ALLERGIES:  Codeine, Isosorbide nitrate, Levaquin [levofloxacin], Lorcet [hydrocodone-acetaminophen], Other, Percocet [oxycodone-acetaminophen], Remeron [mirtazapine], Septra [sulfamethoxazole-trimethoprim], Ultram [tramadol], and Zofran [ondansetron hcl]    CC/ Focus of the visit: Anxiety/depression    HPI: Patient reports that she is done well having tapered off the escitalopram while continuing the quetiapine and alprazolam.  She has not had any significant sustained clinical depressive episodes and has not experienced recurrence of prohibitive anxious symptomatology such as panic attacks.  No avoidance behavior. She has taken on  of a 2-year-old adopted grandchild along with continuing with the 8-year-old great grandson.  Her  assists in this effort.  Patient is looking to taper the quetiapine but is been unsuccessful with prior attempts to taper off the benzodiazepine, alprazolam.  There is absolutely no indication of prescription misuse or substance abuse, no addictive behaviors.  She is found in the alprazolam very beneficial for a number of years and desires to continue to take this medication as prescribed for the benefit it has provided.    PFSH: At home with her  and the  responsibilities, a peaceful place.    Review of Systems  No cardiopulmonary, GI or neurological complaints.    SUPPORTIVE PSYCHOTHERAPY: continuing efforts to promote the therapeutic alliance, address the patient’s issues, and strengthen self awareness, insights, and positive coping skills such as Exercising, listen to music, spending time in nature and utilizing resources.     Mental Status Exam  Appearance:  appropriate  Attitude " toward clinician:  cooperative and agreeable   Speech:    Rate:  regular rate and rhythm   Volume: normal  Motor:  no abnormal movements   Mood:  Good  Affect:  euthymic  Thought Processes:  linear, logical, and goal directed  Thought Content:  Normal   Feeling Hopeless: absent  Suicidal Thoughts or Intent:  absent  Homicidal Thoughts:  absent  Perceptual Disturbance: no perceptual disturbance  Attention and Concentration:  good  Insight and Judgement:  good  Memory:  memory appears to be intact    LABS: No results found for this or any previous visit (from the past 168 hour(s)).    MEDICATION ISSUES: Have discussed with the patient the medications Risks, Benefits, and Side effects including potential falls, possible impaired driving and  metabolic adversities among others. No medication side effects or related complaints today.     TREATMENT PLAN/GOALS: Continue supportive psychotherapy efforts and medications as indicated.     Current Outpatient Medications   Medication Sig Dispense Refill    ALPRAZolam XR (XANAX XR) 1 MG 24 hr tablet TAKE 1 TABLET BY MOUTH TWO TIMES A DAY FOR ANXIETY 180 tablet 1    aspirin 81 MG EC tablet Take 1 tablet by mouth Daily.      bumetanide (BUMEX) 1 MG tablet Take 1 tablet by mouth Daily As Needed (swelling).      Calcium Citrate-Vitamin D3 (CITRACAL) 315-6.25 MG-MCG tablet tablet Take  by mouth Daily.      docusate sodium (COLACE) 250 MG capsule Take 1 capsule by mouth Daily.      doxepin (SINEquan) 25 MG capsule       famotidine (PEPCID) 20 MG tablet       fexofenadine (ALLEGRA) 180 MG tablet       levothyroxine (SYNTHROID, LEVOTHROID) 75 MCG tablet TAKE 1 TABLET BY MOUTH EVERY MORNING ON AN EMPTY STOMACH AS DIRECTED FOR THYROID      nitroglycerin (NITROSTAT) 0.4 MG SL tablet Place 1 tablet under the tongue Every 5 (Five) Minutes As Needed for Chest Pain. 25 tablet 5    polyethylene glycol (MIRALAX) powder As Needed.  2    potassium chloride (K-DUR,KLOR-CON) 20 MEQ CR tablet Take 1  tablet by mouth Daily. 90 tablet 3    promethazine (PHENERGAN) 25 MG tablet 2 (Two) Times a Day As Needed.      QUEtiapine (SEROquel) 50 MG tablet Take 1 tablet by mouth Every Night. 90 tablet 1    SIMVASTATIN PO Take 40 mg by mouth Every Night. Unsure of dose      cetirizine (zyrTEC) 10 MG tablet  (Patient not taking: Reported on 8/13/2024)      Thea 0.075 MG/24HR patch APPLY ONE PATCH TO AFFECTED AREA (CLEAN DRY SKIN) TWO TIMES A WEEK AS DIRECTED BY YOUR PRESCRIBER FOR HORMONE REPLACEMENT THERAPY (Patient not taking: Reported on 8/13/2024)      escitalopram (LEXAPRO) 10 MG tablet Take 1 daily (Patient not taking: Reported on 8/13/2024) 30 tablet 0    topiramate (TOPAMAX) 50 MG tablet Take 1 tablet by mouth Daily. (Patient not taking: Reported on 8/13/2024)       No current facility-administered medications for this visit.       COLLATERAL PSYCHOTHERAPEUTIC INTERVENTION: patient not interested in additional psychotherapy.    RISK:  low    Assessment & Plan     Diagnoses and all orders for this visit:    1. Dysthymic disorder  -     QUEtiapine (SEROquel) 50 MG tablet; Take 1 tablet by mouth Every Night.  Dispense: 90 tablet; Refill: 1    2. Panic disorder (episodic paroxysmal anxiety)  -     ALPRAZolam XR (XANAX XR) 1 MG 24 hr tablet; TAKE 1 TABLET BY MOUTH TWO TIMES A DAY FOR ANXIETY  Dispense: 180 tablet; Refill: 1        Return in about 6 months (around 2/13/2025).           Patient knows to call if symptoms worsen or fail to improve between appointments.    I spent 30 minutes caring for Elsa on this date of service. This time includes time spent by me in the following activities: Patient evaluation, support psychotherapy, decisions, medications, and documentation.     Dictated utilizing [email or web address] dictation    SATURNINO Ibarra MD

## 2024-09-18 ENCOUNTER — TELEPHONE (OUTPATIENT)
Dept: PSYCHIATRY | Facility: CLINIC | Age: 69
End: 2024-09-18
Payer: MEDICARE

## 2024-09-18 NOTE — TELEPHONE ENCOUNTER
Patient called states she had requested to be taken off her lexapro but lately her anxiety has increased. States she feels she needs to go back on the 10 mg lexapro and should have never quit it. Requesting medication be sent in.

## 2024-09-20 ENCOUNTER — TELEPHONE (OUTPATIENT)
Dept: PSYCHIATRY | Facility: HOSPITAL | Age: 69
End: 2024-09-20
Payer: MEDICARE

## 2024-09-20 DIAGNOSIS — F34.1 DYSTHYMIC DISORDER: ICD-10-CM

## 2024-09-20 DIAGNOSIS — F41.0 PANIC DISORDER (EPISODIC PAROXYSMAL ANXIETY): ICD-10-CM

## 2024-09-20 RX ORDER — ESCITALOPRAM OXALATE 10 MG/1
TABLET ORAL
Qty: 30 TABLET | Refills: 2 | Status: SHIPPED | OUTPATIENT
Start: 2024-09-20

## 2024-11-22 ENCOUNTER — TELEPHONE (OUTPATIENT)
Dept: PSYCHIATRY | Facility: CLINIC | Age: 69
End: 2024-11-22
Payer: MEDICARE

## 2024-11-22 NOTE — TELEPHONE ENCOUNTER
Patient called states that her Seroquel was reduced last visit.States she has still had an old prescription and been taking the 100 mg. Patient reports this has been working great for her Patient is requesting a new prescription for the 100 mg dose be sent in

## 2024-11-25 DIAGNOSIS — Z51.81 MEDICATION MONITORING ENCOUNTER: ICD-10-CM

## 2024-11-25 RX ORDER — QUETIAPINE FUMARATE 100 MG/1
100 TABLET, FILM COATED ORAL NIGHTLY
Qty: 90 TABLET | Refills: 0 | Status: SHIPPED | OUTPATIENT
Start: 2024-11-25

## 2025-02-11 DIAGNOSIS — F41.0 PANIC DISORDER (EPISODIC PAROXYSMAL ANXIETY): ICD-10-CM

## 2025-02-11 DIAGNOSIS — F34.1 DYSTHYMIC DISORDER: ICD-10-CM

## 2025-02-11 RX ORDER — ESCITALOPRAM OXALATE 10 MG/1
TABLET ORAL
Qty: 30 TABLET | Refills: 1 | Status: SHIPPED | OUTPATIENT
Start: 2025-02-11

## 2025-02-11 RX ORDER — QUETIAPINE FUMARATE 100 MG/1
100 TABLET, FILM COATED ORAL NIGHTLY
Qty: 30 TABLET | Refills: 1 | Status: SHIPPED | OUTPATIENT
Start: 2025-02-11

## 2025-02-11 RX ORDER — ALPRAZOLAM 1 MG/1
TABLET, EXTENDED RELEASE ORAL
Qty: 60 TABLET | Refills: 1 | Status: SHIPPED | OUTPATIENT
Start: 2025-02-11

## 2025-02-11 NOTE — TELEPHONE ENCOUNTER
Patient had to reschedule appointment due to illness. Requesting refills to get her through until appointment in March

## 2025-02-27 ENCOUNTER — PRIOR AUTHORIZATION (OUTPATIENT)
Dept: PSYCHIATRY | Facility: CLINIC | Age: 70
End: 2025-02-27
Payer: MEDICARE

## 2025-02-27 NOTE — TELEPHONE ENCOUNTER
Approved today by [employer] 2017  Your request has been approved  Effective Date: 1/1/2025  Authorization Expiration Date: 12/31/2025

## 2025-03-18 ENCOUNTER — OFFICE VISIT (OUTPATIENT)
Dept: PSYCHIATRY | Facility: CLINIC | Age: 70
End: 2025-03-18
Payer: MEDICARE

## 2025-03-18 VITALS
BODY MASS INDEX: 30.48 KG/M2 | HEART RATE: 79 BPM | HEIGHT: 63 IN | WEIGHT: 172 LBS | SYSTOLIC BLOOD PRESSURE: 126 MMHG | DIASTOLIC BLOOD PRESSURE: 72 MMHG | OXYGEN SATURATION: 96 %

## 2025-03-18 DIAGNOSIS — F41.0 PANIC DISORDER (EPISODIC PAROXYSMAL ANXIETY): ICD-10-CM

## 2025-03-18 DIAGNOSIS — F34.1 DYSTHYMIC DISORDER: ICD-10-CM

## 2025-03-18 PROCEDURE — 3078F DIAST BP <80 MM HG: CPT | Performed by: PSYCHIATRY & NEUROLOGY

## 2025-03-18 PROCEDURE — 99214 OFFICE O/P EST MOD 30 MIN: CPT | Performed by: PSYCHIATRY & NEUROLOGY

## 2025-03-18 PROCEDURE — 3074F SYST BP LT 130 MM HG: CPT | Performed by: PSYCHIATRY & NEUROLOGY

## 2025-03-18 RX ORDER — QUETIAPINE FUMARATE 100 MG/1
100 TABLET, FILM COATED ORAL NIGHTLY
Qty: 90 TABLET | Refills: 1 | Status: SHIPPED | OUTPATIENT
Start: 2025-03-18

## 2025-03-18 RX ORDER — ALPRAZOLAM 1 MG/1
TABLET, EXTENDED RELEASE ORAL
Qty: 60 TABLET | Refills: 4 | Status: SHIPPED | OUTPATIENT
Start: 2025-04-17

## 2025-03-18 NOTE — PROGRESS NOTES
"Patient ID: Elsa Porras is a 70 y.o. female    SERVICE TYPE: EVALUATION AND MANAGEMENT (greater than 50% of the time spent for supportive psychotherapy).      /72   Pulse 79   Ht 160 cm (62.99\")   Wt 78 kg (172 lb)   SpO2 96%   BMI 30.48 kg/m²     ALLERGIES:  Codeine, Isosorbide nitrate, Levaquin [levofloxacin], Lorcet [hydrocodone-acetaminophen], Other, Percocet [oxycodone-acetaminophen], Remeron [mirtazapine], Septra [sulfamethoxazole-trimethoprim], Ultram [tramadol], Corticosteroids, and Zofran [ondansetron hcl]    CC/ Focus of the visit: Anxiety/depression     HPI: No recurrent significant episode of major depressive symptoms, low-grade issues with anxiety related to stressors sighted: Daughters recently hospitalized with seizure disorder, failing health of her 87-year-old mother with congestive heart failure.  It seems that basically she is coping with major health issues (see below) fairly well while maintaining her basic interest in functional status.    PFSH: Is doing  taking care of her 2-year-old adopted granddaughter while her mother works.  Home life stable.    Review of Systems  No cardiopulmonary complaints.Major medical issues with esophageal stricture perhaps to require surgery as well as recurrent pelvic prolapse and dermatitis (possibly cancer) right lower extremity.    SUPPORTIVE PSYCHOTHERAPY: continuing efforts to promote the therapeutic alliance, address the patient’s issues, and strengthen self awareness, insights, and positive coping skills such as Exercising, listen to music, spending time in nature and utilizing resources.     Mental Status Exam  Appearance:  appropriate  Attitude toward clinician:  cooperative and agreeable   Speech:    Rate:  regular rate and rhythm   Volume: normal  Motor:  no abnormal movements   Mood:  Good  Affect:  euthymic  Thought Processes:  linear, logical, and goal directed  Thought Content:  Normal   Feeling Hopeless: " absent  Suicidal Thoughts or Intent:  absent  Homicidal Thoughts:  absent  Perceptual Disturbance: no perceptual disturbance  Attention and Concentration:  good  Insight and Judgement:  good  Memory:  memory appears to be intact    LABS: No results found for this or any previous visit (from the past week).    MEDICATION ISSUES: Have discussed with the patient the medications Risks, Benefits, and Side effects including potential falls, possible impaired driving and  metabolic adversities among others. No medication side effects or related complaints today.     TREATMENT PLAN/GOALS: Continue supportive psychotherapy efforts and medications as indicated.     Current Outpatient Medications   Medication Sig Dispense Refill    [START ON 4/17/2025] ALPRAZolam XR (XANAX XR) 1 MG 24 hr tablet TAKE 1 TABLET BY MOUTH TWO TIMES A DAY FOR ANXIETY 60 tablet 4    aspirin 81 MG EC tablet Take 1 tablet by mouth Daily.      bumetanide (BUMEX) 1 MG tablet Take 1 tablet by mouth Daily As Needed (swelling).      Calcium Citrate-Vitamin D3 (CITRACAL) 315-6.25 MG-MCG tablet tablet Take  by mouth Daily.      cetirizine (zyrTEC) 10 MG tablet       docusate sodium (COLACE) 250 MG capsule Take 1 capsule by mouth Daily.      famotidine (PEPCID) 20 MG tablet       fexofenadine (ALLEGRA) 180 MG tablet       levothyroxine (SYNTHROID, LEVOTHROID) 75 MCG tablet TAKE 1 TABLET BY MOUTH EVERY MORNING ON AN EMPTY STOMACH AS DIRECTED FOR THYROID      nitroglycerin (NITROSTAT) 0.4 MG SL tablet Place 1 tablet under the tongue Every 5 (Five) Minutes As Needed for Chest Pain. 25 tablet 5    polyethylene glycol (MIRALAX) powder As Needed.  2    potassium chloride (K-DUR,KLOR-CON) 20 MEQ CR tablet Take 1 tablet by mouth Daily. 90 tablet 3    promethazine (PHENERGAN) 25 MG tablet 2 (Two) Times a Day As Needed.      QUEtiapine (SEROquel) 100 MG tablet Take 1 tablet by mouth Every Night. 90 tablet 1    SIMVASTATIN PO Take 40 mg by mouth Every Night. Unsure of  dose      Thea 0.075 MG/24HR patch APPLY ONE PATCH TO AFFECTED AREA (CLEAN DRY SKIN) TWO TIMES A WEEK AS DIRECTED BY YOUR PRESCRIBER FOR HORMONE REPLACEMENT THERAPY (Patient not taking: Reported on 3/18/2025)       No current facility-administered medications for this visit.       COLLATERAL PSYCHOTHERAPEUTIC INTERVENTION: patient not interested in additional psychotherapy.    RISK:  moderate    Assessment & Plan     Diagnoses and all orders for this visit:    1. Panic disorder (episodic paroxysmal anxiety)  -     ALPRAZolam XR (XANAX XR) 1 MG 24 hr tablet; TAKE 1 TABLET BY MOUTH TWO TIMES A DAY FOR ANXIETY  Dispense: 60 tablet; Refill: 4    2. Dysthymic disorder  -     QUEtiapine (SEROquel) 100 MG tablet; Take 1 tablet by mouth Every Night.  Dispense: 90 tablet; Refill: 1        Return in about 6 months (around 9/18/2025).           Patient knows to call if symptoms worsen or fail to improve between appointments.    I spent 30 minutes caring for Elsa on this date of service. This time includes time spent by me in the following activities: Patient evaluation, support psychotherapy, decisions, medications, and documentation.     Dictated utilizing [employer] dictation    SATURNINO Ibarra MD

## 2025-04-15 ENCOUNTER — PRIOR AUTHORIZATION (OUTPATIENT)
Dept: PSYCHIATRY | Facility: CLINIC | Age: 70
End: 2025-04-15
Payer: MEDICARE

## 2025-04-15 NOTE — TELEPHONE ENCOUNTER
JIM FIELDS (Key: [id number])  PA Case ID #: [id number]  ALPRAZolam XR 1MG er tablets            Approved on April 8 by [employer] 2017  Your request has been approved  Effective Date: 1/1/2025  Authorization Expiration Date: 12/31/2025

## 2025-05-22 ENCOUNTER — TRANSCRIBE ORDERS (OUTPATIENT)
Dept: LAB | Facility: HOSPITAL | Age: 70
End: 2025-05-22
Payer: MEDICARE

## 2025-05-22 ENCOUNTER — HOSPITAL ENCOUNTER (OUTPATIENT)
Facility: HOSPITAL | Age: 70
Discharge: HOME | End: 2025-05-22
Admitting: FAMILY MEDICINE
Payer: MEDICARE

## 2025-05-22 DIAGNOSIS — M25.562 LEFT KNEE PAIN, UNSPECIFIED CHRONICITY: ICD-10-CM

## 2025-05-22 DIAGNOSIS — M25.562 LEFT KNEE PAIN, UNSPECIFIED CHRONICITY: Primary | ICD-10-CM

## 2025-05-22 PROCEDURE — 73564 X-RAY EXAM KNEE 4 OR MORE: CPT

## 2025-05-23 ENCOUNTER — TRANSCRIBE ORDERS (OUTPATIENT)
Dept: ADMINISTRATIVE | Facility: HOSPITAL | Age: 70
End: 2025-05-23
Payer: MEDICARE

## 2025-05-23 DIAGNOSIS — Z78.0 MENOPAUSE: Primary | ICD-10-CM

## 2025-06-16 ENCOUNTER — TRANSCRIBE ORDERS (OUTPATIENT)
Dept: ADMINISTRATIVE | Facility: HOSPITAL | Age: 70
End: 2025-06-16
Payer: MEDICARE

## 2025-06-16 DIAGNOSIS — Z12.31 SCREENING MAMMOGRAM, ENCOUNTER FOR: Primary | ICD-10-CM

## 2025-06-18 ENCOUNTER — HOSPITAL ENCOUNTER (OUTPATIENT)
Facility: HOSPITAL | Age: 70
Discharge: HOME | End: 2025-06-18
Admitting: FAMILY MEDICINE
Payer: MEDICARE

## 2025-06-18 DIAGNOSIS — Z78.0 MENOPAUSE: ICD-10-CM

## 2025-06-18 PROCEDURE — 77080 DXA BONE DENSITY AXIAL: CPT

## 2025-07-22 ENCOUNTER — TELEPHONE (OUTPATIENT)
Dept: PSYCHIATRY | Facility: CLINIC | Age: 70
End: 2025-07-22
Payer: MEDICARE

## 2025-07-22 RX ORDER — QUETIAPINE FUMARATE 50 MG/1
50 TABLET, FILM COATED ORAL NIGHTLY
Qty: 30 TABLET | Refills: 1 | Status: SHIPPED | OUTPATIENT
Start: 2025-07-22

## 2025-07-22 NOTE — TELEPHONE ENCOUNTER
Patient stated that she has decreased Seroquel from 100mg to 50mg and that she is tolerating that well is wants to stay on 50mg. She is requesting a new prescription to be sent in for the 50mg because she is having a hard time splitting the 100mg tablet.

## 2025-08-20 ENCOUNTER — HOSPITAL ENCOUNTER (OUTPATIENT)
Facility: HOSPITAL | Age: 70
Discharge: HOME | End: 2025-08-20
Admitting: FAMILY MEDICINE
Payer: MEDICARE

## 2025-08-20 DIAGNOSIS — Z12.31 SCREENING MAMMOGRAM, ENCOUNTER FOR: ICD-10-CM

## 2025-08-20 PROCEDURE — 77063 BREAST TOMOSYNTHESIS BI: CPT

## 2025-08-20 PROCEDURE — 77067 SCR MAMMO BI INCL CAD: CPT

## (undated) DEVICE — CATH DIAG EXPO .045 FL3  5F 100CM

## (undated) DEVICE — GW PERIPH GUIDERIGHT STD/EXCHNG/J/TIP SS 0.035IN 5X260CM

## (undated) DEVICE — PK CATH CARD 10

## (undated) DEVICE — NDL ANGIOGR ADV THN SMOTH SGLWALL 21G 1.5

## (undated) DEVICE — MODEL AT P65, P/N 701554-001KIT CONTENTS: HAND CONTROLLER, 3-WAY HIGH-PRESSURE STOPCOCK WITH ROTATING END AND PREMIUM HIGH-PRESSURE TUBING: Brand: ANGIOTOUCH® KIT

## (undated) DEVICE — MODEL BT2000 P/N 700287-012KIT CONTENTS: MANIFOLD WITH SALINE AND CONTRAST PORTS, SALINE TUBING WITH SPIKE AND HAND SYRINGE, TRANSDUCER: Brand: BT2000 AUTOMATED MANIFOLD KIT

## (undated) DEVICE — DEV COMP RAD PRELUDESYNC 24CM

## (undated) DEVICE — GLIDESHEATH BASIC HYDROPHILIC COATED INTRODUCER SHEATH: Brand: GLIDESHEATH

## (undated) DEVICE — CATH DIAG EXPO M/ PK 5F FL4/FR4 PIG